# Patient Record
Sex: FEMALE | Race: WHITE | NOT HISPANIC OR LATINO | Employment: FULL TIME | URBAN - NONMETROPOLITAN AREA
[De-identification: names, ages, dates, MRNs, and addresses within clinical notes are randomized per-mention and may not be internally consistent; named-entity substitution may affect disease eponyms.]

---

## 2023-05-12 ENCOUNTER — OFFICE VISIT (OUTPATIENT)
Dept: INTERNAL MEDICINE | Facility: CLINIC | Age: 48
End: 2023-05-12
Payer: COMMERCIAL

## 2023-05-12 VITALS
WEIGHT: 209.5 LBS | DIASTOLIC BLOOD PRESSURE: 78 MMHG | OXYGEN SATURATION: 97 % | BODY MASS INDEX: 33.67 KG/M2 | HEART RATE: 83 BPM | TEMPERATURE: 97.2 F | SYSTOLIC BLOOD PRESSURE: 148 MMHG | HEIGHT: 66 IN

## 2023-05-12 DIAGNOSIS — Z23 NEED FOR COVID-19 VACCINE: ICD-10-CM

## 2023-05-12 DIAGNOSIS — M25.619 LIMITED RANGE OF MOTION (ROM) OF SHOULDER: ICD-10-CM

## 2023-05-12 DIAGNOSIS — M25.511 CHRONIC RIGHT SHOULDER PAIN: Primary | ICD-10-CM

## 2023-05-12 DIAGNOSIS — R03.0 ELEVATED BLOOD PRESSURE READING WITHOUT DIAGNOSIS OF HYPERTENSION: ICD-10-CM

## 2023-05-12 DIAGNOSIS — G89.29 CHRONIC RIGHT SHOULDER PAIN: Primary | ICD-10-CM

## 2023-05-12 DIAGNOSIS — Z72.0 TOBACCO ABUSE: ICD-10-CM

## 2023-05-12 DIAGNOSIS — M75.41 IMPINGEMENT SYNDROME OF RIGHT SHOULDER: ICD-10-CM

## 2023-05-12 NOTE — PROGRESS NOTES
Date: 2023    Name: Dhaval Aguilera  : 1975    Chief Complaint:   Chief Complaint   Patient presents with   • Establish Care     Patient here to establish care, increasing R shoulder pain   • Shoulder Pain     Increasing R shoulder pain, limited ROM       HPI:  Dhaval Aguilera is a 48 y.o. female presents to establish care.    Request records from prior PCP, GYN in [place].  Dionicio Escobar; [facility]. Delvin Javier; [facility]. [facility] ([facility])  Pap smear; 2020; hx hysterectomy, left ovaries  [facility]; [facility]  Due shingles; will get at pharmacy   Thinks she had Tdap <10 years ago  G 4 P 3 Sp Ab 1  No prescriptions, vitamins   Wears dentures  No exercise or diet, planning on starting Golo diet and implement exercise soon  Smoker; 1 PPD x 40 years; 40 pack years. Unsure if any LDCT screening. Tried wellbutrin, chantix, has patches at home. Not ready to quit at this time but would like to in the future. She has quit before.     Chronic pain right shoulder. Onset 3.5 years ago. Completed exercises with bands through "[facility] [facility] from 2022-2023 >6 weeks without relief. Takes naproxen prn for pain. Limited ROM. Right pinky finger paresthesia. Pain localized top of shoulder, refers right side of neck. Full ROM of neck and no pain. No prior imaging.     BP elevated today, no history of this. She checks at home with wrist cuff and says it is typically <120/80s.    History:      Past Surgical History:   Procedure Laterality Date   • SUBTOTAL HYSTERECTOMY  20   • TUBAL ABDOMINAL LIGATION  97       Family History   Problem Relation Age of Onset   • Alcohol abuse Mother    • Asthma Mother    • Liver disease Mother    • Alcohol abuse Maternal Grandfather    • COPD Maternal Grandmother    • Alcohol abuse Paternal Grandfather    • Stroke Paternal Grandfather    • Alcohol abuse Brother    • Alcohol abuse Maternal  "Aunt    • Drug abuse Son    • Drug abuse Son        Social History     Socioeconomic History   • Marital status:    Tobacco Use   • Smoking status: Every Day     Packs/day: 1.00     Years: 40.00     Pack years: 40.00     Types: Cigarettes     Start date: 4/14/1983   Substance and Sexual Activity   • Alcohol use: Not Currently   • Drug use: Never   • Sexual activity: Not Currently     Partners: Male     Birth control/protection: Hysterectomy       No Known Allergies      Current Outpatient Medications:   •  NAPROXEN PO, Take  by mouth., Disp: , Rfl:         VS:  Vitals:    05/12/23 1651 05/12/23 1733   BP: 140/76 148/78   BP Location: Right arm Left arm   Patient Position: Sitting Sitting   Cuff Size: Adult Adult   Pulse: 83    Temp: 97.2 °F (36.2 °C)    TempSrc: Temporal    SpO2: 97%    Weight: 95 kg (209 lb 8 oz)    Height: 168.3 cm (66.25\")      Body mass index is 33.56 kg/m².    PE:  Physical Exam  Vitals and nursing note reviewed.   Constitutional:       General: She is not in acute distress.     Appearance: Normal appearance. She is obese. She is not diaphoretic.   HENT:      Head: Normocephalic and atraumatic.      Right Ear: External ear normal.      Left Ear: External ear normal.      Nose: Nose normal.   Eyes:      General: No scleral icterus.     Extraocular Movements: Extraocular movements intact.   Neck:      Trachea: Trachea and phonation normal.   Cardiovascular:      Rate and Rhythm: Normal rate and regular rhythm.   Pulmonary:      Effort: Pulmonary effort is normal.      Breath sounds: Normal breath sounds.   Abdominal:      Tenderness: There is no abdominal tenderness.   Musculoskeletal:      Right shoulder: Tenderness present. No swelling, deformity or crepitus. Decreased range of motion. Decreased strength.      Cervical back: Normal range of motion.      Comments: + empty can test   Skin:     General: Skin is warm and dry.      Coloration: Skin is not jaundiced or pale.   Neurological:    "   Mental Status: She is alert and oriented to person, place, and time.      Gait: Gait normal.   Psychiatric:         Mood and Affect: Mood normal.         Behavior: Behavior normal.       Assessment/Plan:         Diagnoses and all orders for this visit:    1. Chronic right shoulder pain (Primary)  -     Ambulatory Referral to Orthopedic Surgery  -     MRI Shoulder Right Without Contrast; Future    2. Limited range of motion (ROM) of shoulder  -     Ambulatory Referral to Orthopedic Surgery  -     MRI Shoulder Right Without Contrast; Future    3. Impingement syndrome of right shoulder  -     Ambulatory Referral to Orthopedic Surgery  -     MRI Shoulder Right Without Contrast; Future    4. Elevated blood pressure reading without diagnosis of hypertension    5. Tobacco abuse    6. Need for COVID-19 vaccine  -     COVID-19 (Pfizer) Bivalent 12+yrs      Continue exercises, stretches, NSAIDs prn. Patient has failed PT >6 weeks. MRI ordered. Referral to orthopedics.     Blood pressure is elevated today.  Recommend DASH diet, less than 1500 mg of sodium daily, 150 minutes of exercise per week, and home blood pressure monitoring.  Keep a log of your blood pressures and bring with you into the next appointment along with your home blood pressure machine.  When you check your blood pressure, make sure you have been sitting for 15 to 20 minutes, feet flat on the floor, and arm at the level of your heart.  Normal BP is <120/80.  If blood pressure is over 180/110, be seen urgently.     Updated COVID-19 booster.     Dhaval Aguilera  reports that she has been smoking cigarettes. She started smoking about 40 years ago. She has a 40.00 pack-year smoking history. She does not have any smokeless tobacco history on file. I have educated her on the risk of diseases from using tobacco products.   I advised her to quit and she is Not Willing to Quit Tobacco Products at this time. I spent 5 minutes counseling the patient.       Return in  about 8 weeks (around 7/7/2023) for Annual.      Lillie Mcdowell

## 2023-05-18 ENCOUNTER — APPOINTMENT (OUTPATIENT)
Dept: GENERAL RADIOLOGY | Facility: HOSPITAL | Age: 48
End: 2023-05-18
Payer: COMMERCIAL

## 2023-05-18 ENCOUNTER — HOSPITAL ENCOUNTER (EMERGENCY)
Facility: HOSPITAL | Age: 48
Discharge: HOME | End: 2023-05-18
Attending: EMERGENCY MEDICINE
Payer: COMMERCIAL

## 2023-05-18 VITALS
SYSTOLIC BLOOD PRESSURE: 147 MMHG | DIASTOLIC BLOOD PRESSURE: 87 MMHG | HEART RATE: 87 BPM | BODY MASS INDEX: 33.59 KG/M2 | OXYGEN SATURATION: 96 % | TEMPERATURE: 98.7 F | RESPIRATION RATE: 17 BRPM | HEIGHT: 66 IN | WEIGHT: 209 LBS

## 2023-05-18 DIAGNOSIS — M79.672 LEFT FOOT PAIN: Primary | ICD-10-CM

## 2023-05-18 PROCEDURE — 73630 X-RAY EXAM OF FOOT: CPT

## 2023-05-18 PROCEDURE — 99284 EMERGENCY DEPT VISIT MOD MDM: CPT

## 2023-05-18 RX ORDER — IBUPROFEN 800 MG/1
800 TABLET ORAL ONCE
Status: COMPLETED | OUTPATIENT
Start: 2023-05-18 | End: 2023-05-18

## 2023-05-18 RX ADMIN — IBUPROFEN 800 MG: 800 TABLET ORAL at 18:54

## 2023-05-18 NOTE — ED PROVIDER NOTES
Subjective  History of Present Illness:    This is a 48-year-old female not on any anticoagulation who presents emergency room today for left foot pain.  Patient states that yesterday when lifting her foot she accidentally grazed the side of the couch with her left lateral foot and had minor pain afterwards.  Went to sleep, woke up with a soreness on the outside of her foot, tells me that she works at [employer] and had to stand on her feet all day causing worsening of the pain.  Pain is worse with weightbearing and ambulation and movement.  No bruising or swelling.  No falls or other trauma to the foot.  No ankle pain no other physical complaints.      Nurses Notes reviewed and agree, including vitals, allergies, social history and prior medical history.     REVIEW OF SYSTEMS: All systems reviewed and not pertinent unless noted.  Review of Systems   Musculoskeletal:        Left foot pain   All other systems reviewed and are negative.      History reviewed. No pertinent past medical history.    Allergies:    Patient has no known allergies.      Past Surgical History:   Procedure Laterality Date   • SUBTOTAL HYSTERECTOMY  12/09/20   • TUBAL ABDOMINAL LIGATION  01/18/97         Social History     Socioeconomic History   • Marital status:    Tobacco Use   • Smoking status: Every Day     Packs/day: 1.00     Years: 40.00     Pack years: 40.00     Types: Cigarettes     Start date: 4/14/1983   • Smokeless tobacco: Never   Vaping Use   • Vaping Use: Never used   Substance and Sexual Activity   • Alcohol use: Not Currently   • Drug use: Never   • Sexual activity: Not Currently     Partners: Male     Birth control/protection: Hysterectomy         Family History   Problem Relation Age of Onset   • Alcohol abuse Mother    • Asthma Mother    • Liver disease Mother    • Alcohol abuse Maternal Grandfather    • COPD Maternal Grandmother    • Alcohol abuse Paternal Grandfather    • Stroke Paternal Grandfather    • Alcohol abuse  "Brother    • Alcohol abuse Maternal Aunt    • Drug abuse Son    • Drug abuse Son        Objective  Physical Exam:  /91 (BP Location: Left arm, Patient Position: Sitting)   Pulse 88   Temp 98.7 °F (37.1 °C) (Oral)   Resp 18   Ht 167.6 cm (66\")   Wt 94.8 kg (209 lb)   SpO2 97%   BMI 33.73 kg/m²      Physical Exam  Vitals and nursing note reviewed.   Constitutional:       General: She is not in acute distress.     Appearance: Normal appearance. She is normal weight. She is not ill-appearing, toxic-appearing or diaphoretic.   HENT:      Nose: Nose normal.      Mouth/Throat:      Pharynx: Oropharynx is clear.   Eyes:      Extraocular Movements: Extraocular movements intact.   Cardiovascular:      Pulses: Normal pulses.      Comments: Appears well perfused  Pulmonary:      Effort: Pulmonary effort is normal.   Abdominal:      General: Abdomen is flat.   Musculoskeletal:         General: Tenderness present. No swelling, deformity or signs of injury. Normal range of motion.      Cervical back: Normal range of motion.      Comments: Tenderness palpated to the left lateral aspect of the foot.  Full range of motion of all digits, 2+ pedal and posterior tibial pulses bilaterally.  No overlying ecchymosis or swelling.  No obvious signs of trauma.  No ankle tenderness.  No proximal fibular tenderness.  Neurovascularly intact.   Skin:     General: Skin is warm and dry.      Capillary Refill: Capillary refill takes less than 2 seconds.   Neurological:      General: No focal deficit present.      Mental Status: She is alert and oriented to person, place, and time.   Psychiatric:         Mood and Affect: Mood normal.         Behavior: Behavior normal.         Thought Content: Thought content normal.         Judgment: Judgment normal.             Procedures    ED Course:         Lab Results (last 24 hours)     ** No results found for the last 24 hours. **           No radiology results from the last 24 hrs "       ELVIN    Initial impression of presenting illness: 48-year-old female presents emergency room today for evaluation of left foot pain after accidentally grazing the side of a couch yesterday and standing on it all day today.  Pain worse with movement and weightbearing    DDX: includes but is not limited to: Osseous abnormality occluding fracture dislocation, contusion, musculoskeletal strain or sprain, other    Patient arrives hemodynamically stable afebrile nontachycardic nonhypoxic and nontoxic-appearing with vitals interpreted by myself.     Pertinent features from physical exam: Tenderness palpated to the left lateral aspect of the foot.  Full range of motion of all digits, 2+ pedal and posterior tibial pulses bilaterally.  No overlying ecchymosis or swelling.  No obvious signs of trauma.  No overlying erythema.  No ankle tenderness.  No proximal fibular tenderness.  Neurovascularly intact..    Initial diagnostic plan: Plain film of the left foot    Results from initial plan were reviewed and interpreted by me revealing no acute fracture or dislocation of the left foot as interpreted by me.     Diagnostic information from other sources: Chart review    Interventions / Re-evaluation: Ibuprofen and ice.  Placed in a boot.  Stable for discharge home.    Results/clinical rationale were discussed with patient at bedside    Consultations/Discussion of results with other physicians: Attending physician prior to discharge.  Imaging reviewed with attending physican prior to discharge.    Disposition plan: Discharge home.  Orthopedic and primary care follow-up.  Return precautions given.  Supportive care discussed.  Agreeable to plan at bedside.  -----    Final diagnoses:   Left foot pain        Arash Martinez PA-C  05/18/23 1910

## 2023-05-18 NOTE — DISCHARGE INSTRUCTIONS
Return to the ER for any worsening symptoms.  Recommend close follow-up with your primary care physician, you can additionally call to schedule an appoint with orthopedics.  Wear the boot for comfort and support as needed.  Additionally recommend ice several times daily to the affected area.

## 2023-05-18 NOTE — Clinical Note
[facility] EMERGENCY DEPARTMENT  [address] [address]  Phone: [phone]    Dhaval Aguilera was seen and treated in our emergency department on 5/18/2023.  She may return to work on 05/22/2023.         Thank you for choosing [facility].    Arash Martinez PA-C

## 2023-06-06 ENCOUNTER — TELEMEDICINE (OUTPATIENT)
Dept: FAMILY MEDICINE CLINIC | Facility: TELEHEALTH | Age: 48
End: 2023-06-06
Payer: COMMERCIAL

## 2023-06-06 VITALS — TEMPERATURE: 100.2 F

## 2023-06-06 DIAGNOSIS — J06.9 ACUTE URI: Primary | ICD-10-CM

## 2023-06-06 PROCEDURE — 87635 SARS-COV-2 COVID-19 AMP PRB: CPT | Performed by: NURSE PRACTITIONER

## 2023-06-06 RX ORDER — BROMPHENIRAMINE MALEATE, PSEUDOEPHEDRINE HYDROCHLORIDE, AND DEXTROMETHORPHAN HYDROBROMIDE 2; 30; 10 MG/5ML; MG/5ML; MG/5ML
5 SYRUP ORAL 4 TIMES DAILY PRN
Qty: 118 ML | Refills: 0 | Status: SHIPPED | OUTPATIENT
Start: 2023-06-06 | End: 2023-06-16

## 2023-06-06 RX ORDER — ALBUTEROL SULFATE 90 UG/1
2 AEROSOL, METERED RESPIRATORY (INHALATION) EVERY 4 HOURS PRN
Qty: 18 G | Refills: 0 | Status: SHIPPED | OUTPATIENT
Start: 2023-06-06 | End: 2023-07-06

## 2023-06-06 NOTE — PATIENT INSTRUCTIONS
Order has been placed for SARS-CoV-2 (Coronavirus) test to be done at your nearest [facility]. You don't need to call the Urgent Care, just let them know when you arrive that you have been assessed by a Virtual Care Provider and that you have an order for testing. We will call with results when they are available. The results will be immediately released to your [employer] berna and you will receive a message from [employer] to view your result. Since we are mandated to call all results to the patients, you will receive a call from a blocked number. If you do not answer, you will be sent a message after the first attempted call to notify you that your test results are available. Continue to treat your symptoms just as you would for any viral illness. Take Tylenol for pain and/or fever,  Stay hydrated, rest and treat cough with over-the-counter cough syrup such as Robitussin. You will need to Self Quarantine until the following criteria has been met:   ?At least five days have passed since symptoms first appeared AND    ?At least one day (24 hours) has passed since resolution of fever without the use of fever-reducing medications AND     ?There is improvement in symptoms (eg, cough, shortness of breath)    After discontinuing home isolation, patients should continue to wear a well-fitting mask around others for 5 additional days. The total duration of isolation plus strict masking is 10 days. During this time, patients should avoid people who are immunocompromised or at high risk for severe disease. Additional information on restrictions (eg, travel) after the five-day isolation period can be found on the CDC website.  Upper Respiratory Infection, Adult  An upper respiratory infection (URI) is a common viral infection of the nose, throat, and upper air passages that lead to the lungs. The most common type of URI is the common cold. URIs usually get better on their own, without medical treatment.  What are the  causes?  A URI is caused by a virus. You may catch a virus by:  Breathing in droplets from an infected person's cough or sneeze.  Touching something that has been exposed to the virus (is contaminated) and then touching your mouth, nose, or eyes.  What increases the risk?  You are more likely to get a URI if:  You are very young or very old.  You have close contact with others, such as at work, school, or a health care facility.  You smoke.  You have long-term (chronic) heart or lung disease.  You have a weakened disease-fighting system (immune system).  You have nasal allergies or asthma.  You are experiencing a lot of stress.  You have poor nutrition.  What are the signs or symptoms?  A URI usually involves some of the following symptoms:  Runny or stuffy (congested) nose.  Cough.  Sneezing.  Sore throat.  Headache.  Fatigue.  Fever.  Loss of appetite.  Pain in your forehead, behind your eyes, and over your cheekbones (sinus pain).  Muscle aches.  Redness or irritation of the eyes.  Pressure in the ears or face.  How is this diagnosed?  This condition may be diagnosed based on your medical history and symptoms, and a physical exam. Your health care provider may use a swab to take a mucus sample from your nose (nasal swab). This sample can be tested to determine what virus is causing the illness.  How is this treated?  URIs usually get better on their own within 7-10 days. Medicines cannot cure URIs, but your health care provider may recommend certain medicines to help relieve symptoms, such as:  Over-the-counter cold medicines.  Cough suppressants. Coughing is a type of defense against infection that helps to clear the respiratory system, so take these medicines only as recommended by your health care provider.  Fever-reducing medicines.  Follow these instructions at home:  Activity  Rest as needed.  If you have a fever, stay home from work or school until your fever is gone or until your health care provider says  your URI cannot spread to other people (is no longer contagious). Your health care provider may have you wear a face mask to prevent your infection from spreading.  Relieving symptoms  Gargle with a mixture of salt and water 3-4 times a day or as needed. To make salt water, completely dissolve ½-1 tsp (3-6 g) of salt in 1 cup (237 mL) of warm water.  Use a cool-mist humidifier to add moisture to the air. This can help you breathe more easily.  Eating and drinking    Drink enough fluid to keep your urine pale yellow.  Eat soups and other clear broths.  General instructions    Take over-the-counter and prescription medicines only as told by your health care provider. These include cold medicines, fever reducers, and cough suppressants.  Do not use any products that contain nicotine or tobacco. These products include cigarettes, chewing tobacco, and vaping devices, such as e-cigarettes. If you need help quitting, ask your health care provider.  Stay away from secondhand smoke.  Stay up to date on all immunizations, including the yearly (annual) flu vaccine.  Keep all follow-up visits. This is important.  How to prevent the spread of infection to others  URIs can be contagious. To prevent the infection from spreading:  Wash your hands with soap and water for at least 20 seconds. If soap and water are not available, use hand .  Avoid touching your mouth, face, eyes, or nose.  Cough or sneeze into a tissue or your sleeve or elbow instead of into your hand or into the air.    Contact a health care provider if:  You are getting worse instead of better.  You have a fever or chills.  Your mucus is brown or red.  You have yellow or brown discharge coming from your nose.  You have pain in your face, especially when you bend forward.  You have swollen neck glands.  You have pain while swallowing.  You have white areas in the back of your throat.  Get help right away if:  You have shortness of breath that gets  worse.  You have severe or persistent:  Headache.  Ear pain.  Sinus pain.  Chest pain.  You have chronic lung disease along with any of the following:  Making high-pitched whistling sounds when you breathe, most often when you breathe out (wheezing).  Prolonged cough (more than 14 days).  Coughing up blood.  A change in your usual mucus.  You have a stiff neck.  You have changes in your:  Vision.  Hearing.  Thinking.  Mood.  These symptoms may be an emergency. Get help right away. Call 911.  Do not wait to see if the symptoms will go away.  Do not drive yourself to the hospital.  Summary  An upper respiratory infection (URI) is a common infection of the nose, throat, and upper air passages that lead to the lungs.  A URI is caused by a virus.  URIs usually get better on their own within 7-10 days.  Medicines cannot cure URIs, but your health care provider may recommend certain medicines to help relieve symptoms.  This information is not intended to replace advice given to you by your health care provider. Make sure you discuss any questions you have with your health care provider.  Document Revised: 07/20/2022 Document Reviewed: 07/20/2022  Elsevier Patient Education © 2022 [employer]

## 2023-06-06 NOTE — PROGRESS NOTES
"Chief Complaint   Patient presents with    Cough    Nasal Congestion    Headache       Video Visit Reason:   Free Text Description: If I should go and get tested for covid  Subjective   Dhaval Aguilera is a 48 y.o. female.     History of Present Illness  Sudden onset of sore throat, headache, nasal congestion, cough and shortness of breath today with temp of 100.2. She has some seasonal allergies in the fall/winter but she did take allergy medication without any relief today. She has discomfort in the chest that she describes as feeling \"like there is fluid in the lung\" and she points to central chest area. No wheezing.   Cough  This is a new problem. The current episode started today. The problem has been rapidly worsening. The problem occurs every few minutes. The cough is Non-productive. Associated symptoms include a fever, headaches, nasal congestion, postnasal drip, a sore throat and shortness of breath. Pertinent negatives include no chills or wheezing. Risk factors for lung disease include smoking/tobacco exposure. She has tried nothing for the symptoms. Her past medical history is significant for environmental allergies. There is no history of asthma.     The following portions of the patient's history were reviewed and updated as appropriate: allergies, current medications, past medical history, and problem list.      History reviewed. No pertinent past medical history.  Social History     Socioeconomic History    Marital status:    Tobacco Use    Smoking status: Every Day     Packs/day: 1.00     Years: 40.00     Pack years: 40.00     Types: Cigarettes     Start date: 4/14/1983    Smokeless tobacco: Never   Vaping Use    Vaping Use: Never used   Substance and Sexual Activity    Alcohol use: Not Currently    Drug use: Never    Sexual activity: Not Currently     Partners: Male     Birth control/protection: Hysterectomy     medication documentation: reviewed and updated with patient and   Current " Outpatient Medications:     NAPROXEN PO, Take  by mouth., Disp: , Rfl:     albuterol sulfate  (90 Base) MCG/ACT inhaler, Inhale 2 puffs Every 4 (Four) Hours As Needed for Wheezing or Shortness of Air for up to 30 days., Disp: 18 g, Rfl: 0    brompheniramine-pseudoephedrine-DM 30-2-10 MG/5ML syrup, Take 5 mL by mouth 4 (Four) Times a Day As Needed for Congestion or Cough for up to 10 days., Disp: 118 mL, Rfl: 0  Review of Systems   Constitutional:  Positive for activity change and fever. Negative for chills.   HENT:  Positive for congestion, postnasal drip and sore throat.    Respiratory:  Positive for cough, chest tightness and shortness of breath. Negative for wheezing.    Gastrointestinal:  Negative for nausea and vomiting.   Allergic/Immunologic: Positive for environmental allergies.   Neurological:  Positive for headaches. Negative for dizziness and light-headedness.     Objective   Temp 100.2 °F (37.9 °C)     Physical Exam  Constitutional:       General: She is not in acute distress.     Appearance: She is ill-appearing.   HENT:      Nose: Congestion present.      Mouth/Throat:      Pharynx: Oropharynx is clear.   Pulmonary:      Effort: Pulmonary effort is normal.      Comments: Frequent cough and hoarseness  Neurological:      Mental Status: She is alert.   Psychiatric:         Mood and Affect: Mood normal.         Speech: Speech normal.       Assessment & Plan   Diagnoses and all orders for this visit:    1. Acute URI (Primary)  -     COVID-19 PCR, [facility], NP SWAB IN [facility]/ORAL SWISH 24-30 HR TAT - Swab, Nasopharynx; Future  -     albuterol sulfate  (90 Base) MCG/ACT inhaler; Inhale 2 puffs Every 4 (Four) Hours As Needed for Wheezing or Shortness of Air for up to 30 days.  Dispense: 18 g; Refill: 0  -     brompheniramine-pseudoephedrine-DM 30-2-10 MG/5ML syrup; Take 5 mL by mouth 4 (Four) Times a Day As Needed for Congestion or Cough for up to 10 days.  Dispense: 118  mL; Refill: 0                    Follow Up:  If your symptoms are not resolving by the completion of your treatment or are worsening, see your primary care provider for follow up. If you don't have a primary care provider, you may go to any Urgent Care for re-evaluation. If you develop any life threatening symptoms, go to the nearest Emergency Department immediately or call EMS.               The use of  Video Visit was utilized during this visit, using both [employer] and [employer]. The use of a video visit has been reviewed with the patient and verbal informed consent has been obtained. No technical difficulties occurred during the visit.    is located at [address] [place] [address] 8[facility] [address]  Provider is located at [place], [place]

## 2023-06-13 ENCOUNTER — HOSPITAL ENCOUNTER (OUTPATIENT)
Dept: MRI IMAGING | Facility: HOSPITAL | Age: 48
Discharge: HOME | End: 2023-06-13
Admitting: NURSE PRACTITIONER
Payer: COMMERCIAL

## 2023-06-13 DIAGNOSIS — G89.29 CHRONIC RIGHT SHOULDER PAIN: ICD-10-CM

## 2023-06-13 DIAGNOSIS — M25.511 CHRONIC RIGHT SHOULDER PAIN: ICD-10-CM

## 2023-06-13 DIAGNOSIS — M75.41 IMPINGEMENT SYNDROME OF RIGHT SHOULDER: ICD-10-CM

## 2023-06-13 DIAGNOSIS — M25.619 LIMITED RANGE OF MOTION (ROM) OF SHOULDER: ICD-10-CM

## 2023-06-13 PROCEDURE — 73221 MRI JOINT UPR EXTREM W/O DYE: CPT

## 2023-06-15 DIAGNOSIS — M25.511 ARTHRALGIA OF RIGHT SHOULDER REGION: Primary | ICD-10-CM

## 2023-06-16 ENCOUNTER — OFFICE VISIT (OUTPATIENT)
Dept: ORTHOPEDIC SURGERY | Facility: CLINIC | Age: 48
End: 2023-06-16
Payer: COMMERCIAL

## 2023-06-16 VITALS
WEIGHT: 208 LBS | SYSTOLIC BLOOD PRESSURE: 133 MMHG | HEART RATE: 76 BPM | DIASTOLIC BLOOD PRESSURE: 83 MMHG | HEIGHT: 66 IN | BODY MASS INDEX: 33.43 KG/M2

## 2023-06-16 DIAGNOSIS — M75.101 NONTRAUMATIC TEAR OF RIGHT SUPRASPINATUS TENDON: Primary | ICD-10-CM

## 2023-06-16 DIAGNOSIS — M75.111 NONTRAUMATIC INCOMPLETE TEAR OF RIGHT ROTATOR CUFF: ICD-10-CM

## 2023-06-16 DIAGNOSIS — M54.12 CERVICAL RADICULOPATHY: ICD-10-CM

## 2023-06-16 RX ORDER — METHYLPREDNISOLONE ACETATE 40 MG/ML
40 INJECTION, SUSPENSION INTRA-ARTICULAR; INTRALESIONAL; INTRAMUSCULAR; SOFT TISSUE
Status: COMPLETED | OUTPATIENT
Start: 2023-06-16 | End: 2023-06-16

## 2023-06-16 RX ORDER — CYCLOBENZAPRINE HCL 10 MG
10 TABLET ORAL NIGHTLY PRN
Qty: 30 TABLET | Refills: 0 | Status: SHIPPED | OUTPATIENT
Start: 2023-06-16

## 2023-06-16 RX ORDER — LIDOCAINE HYDROCHLORIDE 20 MG/ML
2 INJECTION, SOLUTION INFILTRATION; PERINEURAL
Status: COMPLETED | OUTPATIENT
Start: 2023-06-16 | End: 2023-06-16

## 2023-06-16 RX ADMIN — METHYLPREDNISOLONE ACETATE 40 MG: 40 INJECTION, SUSPENSION INTRA-ARTICULAR; INTRALESIONAL; INTRAMUSCULAR; SOFT TISSUE at 09:10

## 2023-06-16 RX ADMIN — LIDOCAINE HYDROCHLORIDE 2 ML: 20 INJECTION, SOLUTION INFILTRATION; PERINEURAL at 09:10

## 2023-06-16 NOTE — PROGRESS NOTES
MRI of shoulder shows partial thickness articular surface tear of the supraspinatus tendon, no evidence of full-thickness rotator cuff tear, mild bone marrow edema in the greater tuberosity  I forwarded results to your orthopedic provider and see that you have an appointment with him tomorrow. Can review in further detail then.

## 2023-06-16 NOTE — PROGRESS NOTES
Office Note     Name: Dhaval Aguilera    : 1975     MRN: [id number]     Chief Complaint  Pain of the Right Shoulder (States she has been having pain for about three years, it has been getting worse. No known injury. States she does have numbness in her arm and fingers.)    Subjective     History of Present Illness:  Dhaval Aguilera is a 48 y.o. female presenting today for evaluation of right shoulder pain.  She has recent MRI showing a tear of the supraspinatus tendon nontraumatic.  Patient also complains of numbness and tingling going down the posterior arm into her elbow and little and ring fingers.  She states this numbness and tingling has been long lasting and she knows that she has cervical radiculopathy.     Review of Systems   Constitutional:  Negative for fever.   HENT:  Negative for dental problem and voice change.    Eyes:  Negative for visual disturbance.   Respiratory:  Negative for shortness of breath.    Cardiovascular:  Negative for chest pain.   Gastrointestinal:  Negative for abdominal pain.   Genitourinary:  Negative for dysuria.   Musculoskeletal:  Positive for arthralgias (right shoulder). Negative for gait problem and joint swelling.   Skin:  Negative for rash.   Neurological:  Negative for speech difficulty and numbness.   Hematological:  Does not bruise/bleed easily.   Psychiatric/Behavioral:  Negative for confusion.       History reviewed. No pertinent past medical history.     Past Surgical History:   Procedure Laterality Date    SUBTOTAL HYSTERECTOMY  20    TUBAL ABDOMINAL LIGATION  97       Family History   Problem Relation Age of Onset    Alcohol abuse Mother     Asthma Mother     Liver disease Mother     Alcohol abuse Maternal Grandfather     COPD Maternal Grandmother     Alcohol abuse Paternal Grandfather     Stroke Paternal Grandfather     Alcohol abuse Brother     Alcohol abuse Maternal Aunt     Drug abuse Son     Drug abuse Son        Social History  "    Socioeconomic History    Marital status:    Tobacco Use    Smoking status: Every Day     Packs/day: 1.00     Years: 40.00     Pack years: 40.00     Types: Cigarettes     Start date: 4/14/1983    Smokeless tobacco: Never   Vaping Use    Vaping Use: Never used   Substance and Sexual Activity    Alcohol use: Not Currently    Drug use: Never    Sexual activity: Not Currently     Partners: Male     Birth control/protection: Hysterectomy         Current Outpatient Medications:     albuterol sulfate  (90 Base) MCG/ACT inhaler, Inhale 2 puffs Every 4 (Four) Hours As Needed for Wheezing or Shortness of Air for up to 30 days., Disp: 18 g, Rfl: 0    cyclobenzaprine (FLEXERIL) 10 MG tablet, Take 1 tablet by mouth At Night As Needed for Muscle Spasms., Disp: 30 tablet, Rfl: 0    NAPROXEN PO, Take  by mouth., Disp: , Rfl:   No current facility-administered medications for this visit.    No Known Allergies        Objective   /83   Pulse 76   Ht 167.6 cm (65.98\")   Wt 94.3 kg (208 lb)   BMI 33.59 kg/m²    BMI is >= 30 and <35. (Class 1 Obesity). The following options were offered after discussion;: weight loss educational material (shared in after visit summary)       Physical Exam  Right Shoulder Exam     Tenderness   The patient is experiencing tenderness in the acromion.    Range of Motion   Active abduction:  abnormal   Passive abduction:  normal   Extension:  normal   External rotation:  abnormal   Forward flexion:  abnormal   Internal rotation 0 degrees:  abnormal     Muscle Strength   Abduction: 4/5   Internal rotation: 4/5   External rotation: 4/5   Supraspinatus: 4/5   Subscapularis: 5/5   Biceps: 5/5     Tests   Bautista test: positive  Impingement: positive  Drop arm: negative    Other   Erythema: absent  Sensation: decreased  Pulse: present    Comments:  Decree sensation over the posterior humeral and shoulder area.             Independent Review of Radiographic Studies:    No new imaging done " "today.  Reviewed relevant prior imaging with patient again today.    PROCEDURE: MRI SHOULDER RIGHT WO CONTRAST-     HISTORY: chronic shoulder pain >3.5 years, limited ROM, impingement  sydrome, suspect rotator cuff pathology, positive empty can test, >6  weeks of home therapy/exercises through program \"[facility]\" physical  therapy without relief; M25.511-Pain in right shoulder; G89.29-Other  chronic pain; M25.619-Stiffness of unspecified shoulder, not elsewhere  classified; M75.41-Impingement syndrome of right shoulder        FINDINGS: Multiplanar MR imaging of the right shoulder was performed  without contrast. There is a focal partial thickness articular surface  tear at the anterior footprint of the supraspinatus tendon involving  less than 50% of the thickness of the tendon. No full-thickness rotator  cuff tear is identified. There is mild AC joint arthrosis. A small  amount of fluid is seen in the subacromial/subdeltoid bursa. The glenoid  labrum is intact. The long head of the biceps tendon is intact. No  fractures identified. There is mild bone marrow edema at the anterior  aspect of the greater tuberosity. The musculature is intact. No  significant  glenohumeral joint effusion is seen. No soft tissue mass or  cyst is identified.         IMPRESSION:  Focal partial thickness articular surface tear at the  anterior footprint of the supraspinatus tendon without evidence of  full-thickness rotator cuff tear.     Mild bone marrow edema in the greater tuberosity.    Large Joint Arthrocentesis: R subacromial bursa  Date/Time: 6/16/2023 9:10 AM  Consent given by: patient  Site marked: site marked  Timeout: Immediately prior to procedure a time out was called to verify the correct patient, procedure, equipment, support staff and site/side marked as required   Supporting Documentation  Indications: pain   Procedure Details  Location: shoulder - R subacromial bursa  Preparation: Patient was prepped and draped in the " usual sterile fashion  Needle size: 22 G  Approach: posterior  Medications administered: 40 mg methylPREDNISolone acetate 40 MG/ML; 2 mL lidocaine 2%  Patient tolerance: patient tolerated the procedure well with no immediate complications      Assessment and Plan   Diagnoses and all orders for this visit:    1. Nontraumatic tear of right supraspinatus tendon (Primary)  -     Large Joint Arthrocentesis: R subacromial bursa  -     Ambulatory Referral to Physical Therapy Evaluate and treat, Ortho  -     cyclobenzaprine (FLEXERIL) 10 MG tablet; Take 1 tablet by mouth At Night As Needed for Muscle Spasms.  Dispense: 30 tablet; Refill: 0  -     lidocaine (XYLOCAINE) 2% injection 2 mL  -     methylPREDNISolone acetate (DEPO-medrol) injection 40 mg    2. Nontraumatic incomplete tear of right rotator cuff    3. Cervical radiculopathy       Regular exercise as tolerated  Orthopedic activities reviewed and patient expressed appreciation  Discussion of orthopedic goals  Risk, benefits, and merits of treatment alternatives reviewed with the patient and questions answered  Physical therapy referral given  Avoid offending activity  Ice, heat, and/or modalities as beneficial    Recommendations/Plan:  Exercise, medications, injections, other patient advice, and return appointment as noted.  Referral: Physical and Occupational Therapy referral.  Patient is encouraged to call or return for any issues or concerns.    Patient was given a steroid injection into the right shoulder for symptomatic relief.  She was given a course of physical therapy for strengthening and range of motion improvement.  I advised regular use of the right shoulder however avoid repetitive overhead or push pull movements.  Patient is a current everyday smoker I counseled her on smoking cessation and strongly advised that she quit.  Recheck is scheduled for 5 weeks to evaluate progress.  I did offer the patient a referral to Dr. Elizabeth a spine subspecialist however  she denied this at this time.  Recheck scheduled for 5 weeks advised patient to call or return to office sooner if needed    Return in about 5 weeks (around 7/21/2023), or if symptoms worsen or fail to improve.  Patient agreeable to call or return sooner for any concerns.

## 2023-07-26 ENCOUNTER — TREATMENT (OUTPATIENT)
Dept: PHYSICAL THERAPY | Facility: CLINIC | Age: 48
End: 2023-07-26
Payer: COMMERCIAL

## 2023-07-26 DIAGNOSIS — G89.29 CHRONIC RIGHT SHOULDER PAIN: Primary | ICD-10-CM

## 2023-07-26 DIAGNOSIS — M25.511 CHRONIC RIGHT SHOULDER PAIN: Primary | ICD-10-CM

## 2023-07-26 NOTE — PROGRESS NOTES
Physical Therapy Daily Progress Note    [address] [facility], [address]  [place] [address]      Patient: Dhaval Aguilera   : 1975  Diagnosis/ICD-10 Code:  Chronic right shoulder pain [M25.511, G89.29]  Referring practitioner: Jimmy Azar PA-C  Date of Initial Visit: Type: THERAPY  Noted: 2023  Today's Date: 2023  Patient seen for 5 sessions           Patient reports: awakening with mild pain but not from pain. She reports pain reaching to the side (demonstrates ER at 0 deg).      Objective   See Exercise, Manual, and Modality Logs for complete treatment.       Assessment/Plan  Reducing weight with posterior shoulder strengthening improved patient tolerance. Transitioned ER strengthening to isometrics to build tolerance as she reports pain with light resistance during strength testing. Progressed biceps/SA strengthening with fatigue.            Timed:  Manual Therapy:    3     mins  49058;  Therapeutic Exercise:    14     mins  95701;     Neuromuscular Carlita:   13    mins  97563;    Therapeutic Activity:     10     mins  69340;     Gait Trainin     mins  29215;     Ultrasound:     0     mins  82523;    Electrical Stimulation:    0     mins  25766 ( );    Untimed:  Electrical Stimulation:    0     mins  60203 ( );  Mechanical Traction:    0     mins  66076;     Timed Treatment:   40   mins   Total Treatment:     40   mins    Andrew Hughes PT  Physical Therapist

## 2023-07-28 ENCOUNTER — TREATMENT (OUTPATIENT)
Dept: PHYSICAL THERAPY | Facility: CLINIC | Age: 48
End: 2023-07-28
Payer: COMMERCIAL

## 2023-07-28 DIAGNOSIS — G89.29 CHRONIC RIGHT SHOULDER PAIN: Primary | ICD-10-CM

## 2023-07-28 DIAGNOSIS — M25.511 CHRONIC RIGHT SHOULDER PAIN: Primary | ICD-10-CM

## 2023-07-28 NOTE — PROGRESS NOTES
Re-Assessment / Re-Certification      [address]  [place] [address]    Patient: Dhaval Aguilera   : 1975  Diagnosis/ICD-10 Code:  Chronic right shoulder pain [M25.511, G89.29]  Referring practitioner: Jimmy Azar PA-C  Date of Initial Visit: Type: THERAPY  Noted: 2023  Today's Date: 2023  Patient seen for 6 sessions      Subjective:     Subjective Questionnaire: QuickDASH: 20%  Clinical Progress: improved  Home Program Compliance: Yes  Treatment has included: therapeutic exercise, neuromuscular re-education, manual therapy, and therapeutic activity    Subjective Evaluation    History of Present Illness  Mechanism of injury: CLOF: awakens with R shoulder stiffness/pain but not because of pain, R shoulder pain after prolonged driving, brushing hair using R UE    Medical history: 40 pack/year tobacco use, partial hysterectomy , chronic R cervical radiculopathy since  (R 5th digit numbness)    Subjective comment: Patient reports her R shoulder and R LE are hurting today from prolonged driving the past 2 days. She has had no R hand/finger numbness over the past few weeks.  Patient Occupation: sales at [employer]  Current pain ratin  At best pain ratin  At worst pain ratin       Objective          Active Range of Motion   Left Shoulder   Flexion: 170 degrees   Extension: 70 degrees   Abduction: 175 degrees   External rotation BTH: T4   Internal rotation BTB: T5     Right Shoulder   Flexion: 170 degrees   Extension: 70 degrees   Abduction: 180 degrees   External rotation BTH: T5   Internal rotation BTB: T8     Passive Range of Motion     Right Shoulder   Flexion: WFL and with pain  Abduction: WFL  External rotation 90°: WFL  Internal rotation 90°: WFL    Strength/Myotome Testing     Left Shoulder     Planes of Motion   Flexion: 4+   Extension: 4+   Abduction: 5   External rotation at 0°: 4+   Internal rotation at 0°: 4+     Isolated Muscles   Serratus anterior:  4+     Right Shoulder     Planes of Motion   Flexion: 4+   Extension: 5   Abduction: 5   External rotation at 0°: 4 (mild pain)   Internal rotation at 0°: 4+     Isolated Muscles   Biceps: 5   Serratus anterior: 4+   Triceps: 4+     Tests     Right Shoulder   Positive Neer's.   Negative empty can, full can and Hawkin's.     Assessment & Plan     Assessment  Impairments: abnormal coordination, abnormal muscle firing, abnormal or restricted ROM, activity intolerance, impaired physical strength, lacks appropriate home exercise program and pain with function  Functional Limitations: carrying objects, lifting, sleeping, pulling, pushing, uncomfortable because of pain, moving in bed, reaching behind back, reaching overhead and unable to perform repetitive tasks  Assessment details: Patient presents with chronic, worsening R shoulder pain, weakness, and reduced ROM. MRI revealed partial supraspinatus tear. Patient demonstrates poor R scapulohumeral rhythm during abduction, as well as compensatory scapular elevation with many R shoulder motions, which are likely learned habits over many years. Impingement tests are positive and she demonstrates weakness with shoulder ER and flexion.     7/28- Patient reports 90% perceived improvement in R shoulder since starting PT. She reports significant improvement in BADLs and overall function. R shoulder AROM has normalized with minimal pain. Her remaining deficits are related to R shoulder weakness and poor endurance, which will be the focus of her remaining PT.    Barriers to therapy: chronic tobacco use, chronic R cervical radiculopathy  Prognosis: good    Goals  Plan Goals: Short Term Goals (4 wks)  1. Patient will improve Quick Dash score to < 35 %. Met   2. Pt will demonstrate symmetrical, pain-free shoulder PROM. In progress  3. Patient will be independent and compliant with initial home exercise program. Met     Long Term Goals (8 wks)  1. Patient to demonstrate normal and pain  free UE strength with MMTs. In progress  2. Patient will improve Quick Dash score to < 25 %. Met   3. Patient to awaken <2x/week due to R shoulder pain. Met   4. Pt will demonstrate symmetrical, pain-free shoulder AROM. In progress  5. Pt. will be independent and compliant with advanced home exercise program to facilitate self-management of symptoms. In progress      Plan  Therapy options: will be seen for skilled therapy services  Planned modality interventions: cryotherapy, dry needling, TENS and thermotherapy (hydrocollator packs)  Planned therapy interventions: manual therapy, neuromuscular re-education, soft tissue mobilization, spinal/joint mobilization, therapeutic activities, strengthening, joint mobilization, home exercise program, functional ROM exercises, abdominal trunk stabilization, ADL retraining, balance/weight-bearing training, motor coordination training, body mechanics training, stretching, IADL retraining, flexibility and postural training  Frequency: 2x week  Duration in weeks: 4  Treatment plan discussed with: patient  Plan details: 2x/week for 4 weeks    Progress toward previous goals: Partially Met        Timeframe: 1 month  Prognosis to achieve goals: good    PT Signature: Andrew Hughes, PT  PT License [id number]    Based upon review of the patient's progress and continued therapy plan, it is my medical opinion that Dhaval Aguilera should continue physical therapy treatment at [facility]  [address] [place] [id number] [phone].    Signature: __________________________________  Jimmy Azar PA-C    Timed:  Manual Therapy:    0     mins  81194;  Therapeutic Exercise:    15     mins  17323;     Neuromuscular Carlita:    11    mins  81269;    Therapeutic Activity:     16     mins  71541;     Gait Trainin     mins  44283;     Ultrasound:     0     mins  47068;    Electrical Stimulation:    0     mins  35973 (  );    Untimed:  Electrical Stimulation:    0     mins  61613 ( );  Mechanical Traction:    0     mins  73285;     Timed Treatment:   42   mins   Total Treatment:     42   mins

## 2023-07-31 ENCOUNTER — TREATMENT (OUTPATIENT)
Dept: PHYSICAL THERAPY | Facility: CLINIC | Age: 48
End: 2023-07-31
Payer: COMMERCIAL

## 2023-07-31 DIAGNOSIS — G89.29 CHRONIC RIGHT SHOULDER PAIN: Primary | ICD-10-CM

## 2023-07-31 DIAGNOSIS — M25.511 CHRONIC RIGHT SHOULDER PAIN: Primary | ICD-10-CM

## 2023-08-07 ENCOUNTER — TREATMENT (OUTPATIENT)
Dept: PHYSICAL THERAPY | Facility: CLINIC | Age: 48
End: 2023-08-07
Payer: COMMERCIAL

## 2023-08-07 DIAGNOSIS — G89.29 CHRONIC RIGHT SHOULDER PAIN: Primary | ICD-10-CM

## 2023-08-07 DIAGNOSIS — M25.511 CHRONIC RIGHT SHOULDER PAIN: Primary | ICD-10-CM

## 2023-08-07 NOTE — PROGRESS NOTES
Physical Therapy Daily Progress Note    [address] [facility], [address]  [place] [address]      Patient: Dhaval Aguilera   : 1975  Diagnosis/ICD-10 Code:  Chronic right shoulder pain [M25.511, G89.29]  Referring practitioner: Jimmy Azar PA-C  Date of Initial Visit: Type: THERAPY  Noted: 2023  Today's Date: 2023  Patient seen for 8 sessions           Patient reports: her R shoulder only hurts in the front how, especially when she is trying to lower an item down slowly to the side.      Objective   See Exercise, Manual, and Modality Logs for complete treatment.       Assessment/Plan  Advanced biceps eccentrics. Shoulder horizontal abduction stretch was DCed due to increased pain reported today and over the weekend. R UE fatigue generally reported after treatment. Relief of symptoms reported after shoulder IR stretch.            Timed:  Manual Therapy:    0     mins  81223;  Therapeutic Exercise:    22     mins  49360;     Neuromuscular Carlita:   15    mins  86351;    Therapeutic Activity:     8     mins  78137;     Gait Trainin     mins  64548;     Ultrasound:     0     mins  85747;    Electrical Stimulation:    0     mins  28295 ( );    Untimed:  Electrical Stimulation:    0     mins  75792 ( );  Mechanical Traction:    0     mins  23724;     Timed Treatment:   45   mins   Total Treatment:     45   mins    Andrew Hughes PT  Physical Therapist

## 2023-08-10 ENCOUNTER — TREATMENT (OUTPATIENT)
Dept: PHYSICAL THERAPY | Facility: CLINIC | Age: 48
End: 2023-08-10
Payer: COMMERCIAL

## 2023-08-10 DIAGNOSIS — M25.511 CHRONIC RIGHT SHOULDER PAIN: Primary | ICD-10-CM

## 2023-08-10 DIAGNOSIS — G89.29 CHRONIC RIGHT SHOULDER PAIN: Primary | ICD-10-CM

## 2023-08-10 NOTE — PROGRESS NOTES
Physical Therapy Daily Progress Note    [address]  [place] [address]      Patient: Dhaval Aguilera   : 1975  Diagnosis/ICD-10 Code:  Chronic right shoulder pain [M25.511, G89.29]  Referring practitioner: Jimmy Azar PA-C  Date of Initial Visit: Type: THERAPY  Noted: 2023  Today's Date: 8/10/2023  Patient seen for 9 sessions           Patient reports: her shoulder feels stiff but so does the rest of her body after cleaning her house yesterday.      Objective   See Exercise, Manual, and Modality Logs for complete treatment.       Assessment/Plan  Screen of R shoulder revealed good PROM with only slight pain at end range flexion. Added ER strengthening at 90 to improve dynamic cuff stability, but she reported pain when her elbow was unsupported. Changing position to supine to support her R UE allow this to become pain free.             Timed:  Manual Therapy:    0     mins  94754;  Therapeutic Exercise:    20     mins  58202;     Neuromuscular Carlita:   15    mins  20522;    Therapeutic Activity:     10     mins  98734;     Gait Trainin     mins  25031;     Ultrasound:     0     mins  86638;    Electrical Stimulation:    0     mins  10010 ( );    Untimed:  Electrical Stimulation:    0     mins  79922 ( );  Mechanical Traction:    0     mins  25182;     Timed Treatment:   45   mins   Total Treatment:     45   mins    Andrew Hughes PT  Physical Therapist

## 2023-08-14 ENCOUNTER — TREATMENT (OUTPATIENT)
Dept: PHYSICAL THERAPY | Facility: CLINIC | Age: 48
End: 2023-08-14
Payer: COMMERCIAL

## 2023-08-14 DIAGNOSIS — G89.29 CHRONIC RIGHT SHOULDER PAIN: Primary | ICD-10-CM

## 2023-08-14 DIAGNOSIS — M25.511 CHRONIC RIGHT SHOULDER PAIN: Primary | ICD-10-CM

## 2023-08-14 NOTE — PROGRESS NOTES
Physical Therapy Daily Progress Note    [address] [facility], [address]  [place] [address]      Patient: Dhaval Aguilera   : 1975  Diagnosis/ICD-10 Code:  Chronic right shoulder pain [M25.511, G89.29]  Referring practitioner: Jimmy Azar PA-C  Date of Initial Visit: Type: THERAPY  Noted: 2023  Today's Date: 2023  Patient seen for 10 sessions           Patient reports: her R shoulder felt good this weekend, but her R sciatica has been aggravated. She did not complete new exercise for ER at 90 for HEP.      Objective   See Exercise, Manual, and Modality Logs for complete treatment.       Assessment/Plan  Patient demonstrated improved R UE endurance without c/o pain.  Advanced biceps eccentrics without pain. She continues to demonstrate mild limitation with R shoulder flexion ROM.            Timed:  Manual Therapy:    0     mins  16730;  Therapeutic Exercise:    20     mins  07562;     Neuromuscular Carlita:   12    mins  47107;    Therapeutic Activity:     6     mins  07639;     Gait Trainin     mins  13566;     Ultrasound:     0     mins  00912;    Electrical Stimulation:    0     mins  56823 ( );    Untimed:  Electrical Stimulation:    0     mins  70913 ( );  Mechanical Traction:    0     mins  49727;     Timed Treatment:   38   mins   Total Treatment:     38   mins    Andrew Hughes PT  Physical Therapist

## 2023-08-17 ENCOUNTER — TREATMENT (OUTPATIENT)
Dept: PHYSICAL THERAPY | Facility: CLINIC | Age: 48
End: 2023-08-17
Payer: COMMERCIAL

## 2023-08-17 DIAGNOSIS — M25.511 CHRONIC RIGHT SHOULDER PAIN: Primary | ICD-10-CM

## 2023-08-17 DIAGNOSIS — G89.29 CHRONIC RIGHT SHOULDER PAIN: Primary | ICD-10-CM

## 2023-08-17 NOTE — PROGRESS NOTES
Physical Therapy Daily Progress Note    [address] [facility], [address]  [place] [address]      Patient: Dhaval Aguilera   : 1975  Diagnosis/ICD-10 Code:  Chronic right shoulder pain [M25.511, G89.29]  Referring practitioner: Jimmy Azar PA-C  Date of Initial Visit: Type: THERAPY  Noted: 2023  Today's Date: 2023  Patient seen for 11 sessions           Patient reports: her R shoulder has felt pretty good the past few days.      Objective   See Exercise, Manual, and Modality Logs for complete treatment.       Assessment/Plan  Progressed rotator cuff strengthening in unstable positioning, which patient completed with fatigue but no pain. This had been painful 2 weeks ago when previous attempted. Also progressed [facility] UE strengthening.    Patient reports no recent shoulder pain and overall 85% improvement. Frequency is being reduced to 1x/week.        Timed:  Manual Therapy:    0     mins  29721;  Therapeutic Exercise:    20     mins  32248;     Neuromuscular Carlita:   10    mins  39598;    Therapeutic Activity:     10     mins  97030;     Gait Trainin     mins  71215;     Ultrasound:     0     mins  72634;    Electrical Stimulation:    0     mins  73781 ( );    Untimed:  Electrical Stimulation:    0     mins  56873 ( );  Mechanical Traction:    0     mins  64533;     Timed Treatment:   40   mins   Total Treatment:     40   mins    Andrew Hughes PT  Physical Therapist

## 2023-08-24 ENCOUNTER — TREATMENT (OUTPATIENT)
Dept: PHYSICAL THERAPY | Facility: CLINIC | Age: 48
End: 2023-08-24
Payer: COMMERCIAL

## 2023-08-24 DIAGNOSIS — M25.511 CHRONIC RIGHT SHOULDER PAIN: Primary | ICD-10-CM

## 2023-08-24 DIAGNOSIS — G89.29 CHRONIC RIGHT SHOULDER PAIN: Primary | ICD-10-CM

## 2023-08-24 NOTE — PROGRESS NOTES
Physical Therapy Daily Progress Note    [address] [facility], [address]  [place] [address]      Patient: Dhaval Aguilera   : 1975  Diagnosis/ICD-10 Code:  Chronic right shoulder pain [M25.511, G89.29]  Referring practitioner: Jimmy Azar PA-C  Date of Initial Visit: Type: THERAPY  Noted: 2023  Today's Date: 2023  Patient seen for 12 sessions           Patient reports: her shoulder is 95% improved.       Objective   See Exercise, Manual, and Modality Logs for complete treatment.       Assessment/Plan  Previously painful motions of R shoulder IR in abducted position, in addition to flexion, were pain free without limitation today. She continues to demonstrate R shoulder ER weakness in multiple positions, but this was not painful today. Progressed strengthening as tolerated today, but she reported general fatigue from poor sleep last night.    Patient will be reassessed NV and will likely transition to independent HEP at that time.        Timed:  Manual Therapy:    0     mins  49054;  Therapeutic Exercise:    20     mins  44822;     Neuromuscular Carlita:   10    mins  29999;    Therapeutic Activity:     10     mins  71231;     Gait Trainin     mins  87284;     Ultrasound:     0     mins  40083;    Electrical Stimulation:    0     mins  03413 ( );    Untimed:  Electrical Stimulation:    0     mins  85258 ( );  Mechanical Traction:    0     mins  23809;     Timed Treatment:   40   mins   Total Treatment:     40   mins    Andrew Hughes PT  Physical Therapist

## 2023-08-28 ENCOUNTER — OFFICE VISIT (OUTPATIENT)
Dept: INTERNAL MEDICINE | Facility: CLINIC | Age: 48
End: 2023-08-28
Payer: COMMERCIAL

## 2023-08-28 VITALS
HEART RATE: 81 BPM | BODY MASS INDEX: 33.43 KG/M2 | TEMPERATURE: 97.5 F | OXYGEN SATURATION: 95 % | SYSTOLIC BLOOD PRESSURE: 132 MMHG | DIASTOLIC BLOOD PRESSURE: 80 MMHG | HEIGHT: 66 IN | WEIGHT: 208 LBS

## 2023-08-28 DIAGNOSIS — R03.0 ELEVATED BLOOD PRESSURE READING WITHOUT DIAGNOSIS OF HYPERTENSION: ICD-10-CM

## 2023-08-28 DIAGNOSIS — Z13.228 SCREENING FOR ENDOCRINE, METABOLIC AND IMMUNITY DISORDER: ICD-10-CM

## 2023-08-28 DIAGNOSIS — Z13.1 SCREENING FOR DIABETES MELLITUS (DM): ICD-10-CM

## 2023-08-28 DIAGNOSIS — Z13.0 SCREENING FOR ENDOCRINE, METABOLIC AND IMMUNITY DISORDER: ICD-10-CM

## 2023-08-28 DIAGNOSIS — F17.210 SMOKING GREATER THAN 20 PACK YEARS: ICD-10-CM

## 2023-08-28 DIAGNOSIS — Z12.31 SCREENING MAMMOGRAM FOR BREAST CANCER: ICD-10-CM

## 2023-08-28 DIAGNOSIS — Z13.29 SCREENING FOR THYROID DISORDER: ICD-10-CM

## 2023-08-28 DIAGNOSIS — Z23 NEED FOR TDAP VACCINATION: ICD-10-CM

## 2023-08-28 DIAGNOSIS — Z13.0 SCREENING FOR DEFICIENCY ANEMIA: ICD-10-CM

## 2023-08-28 DIAGNOSIS — Z13.29 SCREENING FOR ENDOCRINE, METABOLIC AND IMMUNITY DISORDER: ICD-10-CM

## 2023-08-28 DIAGNOSIS — Z12.11 SCREENING FOR COLON CANCER: ICD-10-CM

## 2023-08-28 DIAGNOSIS — Z00.00 ANNUAL PHYSICAL EXAM: Primary | ICD-10-CM

## 2023-08-28 DIAGNOSIS — Z11.59 ENCOUNTER FOR HEPATITIS C SCREENING TEST FOR LOW RISK PATIENT: ICD-10-CM

## 2023-08-28 DIAGNOSIS — Z23 NEED FOR PNEUMOCOCCAL VACCINE: ICD-10-CM

## 2023-08-28 DIAGNOSIS — Z13.220 SCREENING FOR CHOLESTEROL LEVEL: ICD-10-CM

## 2023-08-28 DIAGNOSIS — E66.9 OBESITY (BMI 30-39.9): ICD-10-CM

## 2023-08-28 NOTE — PROGRESS NOTES
Subjective   Dhaval Aguilera is a 48 y.o. female and is here for a comprehensive physical exam.       HPI:    Health Habits:  Eye exam within last 2 years? No, will schedule  Dental exam every 6 months? No, has top dentures, lost bottoms  Exercise: No  Diet: typical american diet   Caffeine: coffee, tea, ale8  Alcohol: none  Nicotine: yes,1 pack per day, 40 pack year history   Do you take any herbs or supplements that were not prescribed by a doctor? no     History:  LMP: No LMP recorded. Patient has had a hysterectomy.  Menopause: Yes  Abnormal pap? no  Family history of breast, cervical, colon cancer: no  Mammogram: 2022       reports that she is not currently sexually active and has had partner(s) who are male. She reports using the following method of birth control/protection: Hysterectomy.    History reviewed. No pertinent past medical history.    Past Surgical History:   Procedure Laterality Date    SUBTOTAL HYSTERECTOMY  20    TUBAL ABDOMINAL LIGATION  97       Family History   Problem Relation Age of Onset    Alcohol abuse Mother     Asthma Mother     Liver disease Mother     Alcohol abuse Maternal Grandfather     COPD Maternal Grandmother     Alcohol abuse Paternal Grandfather     Stroke Paternal Grandfather     Alcohol abuse Brother     Alcohol abuse Maternal Aunt     Drug abuse Son     Drug abuse Son        Social History     Socioeconomic History    Marital status:    Tobacco Use    Smoking status: Every Day     Packs/day: 1.00     Years: 40.00     Pack years: 40.00     Types: Cigarettes     Start date: 1983    Smokeless tobacco: Never   Vaping Use    Vaping Use: Never used   Substance and Sexual Activity    Alcohol use: Not Currently    Drug use: Never    Sexual activity: Not Currently     Partners: Male     Birth control/protection: Hysterectomy       No Known Allergies      Current Outpatient Medications:     cyclobenzaprine (FLEXERIL) 10 MG tablet, Take 1 tablet by  "mouth At Night As Needed for Muscle Spasms., Disp: 30 tablet, Rfl: 0    NAPROXEN PO, Take  by mouth., Disp: , Rfl:         Objective   /80   Pulse 81   Temp 97.5 øF (36.4 øC)   Ht 167.6 cm (65.98\")   Wt 94.3 kg (208 lb)   SpO2 95%   BMI 33.59 kg/mý     Physical Exam  Vitals and nursing note reviewed.   Constitutional:       General: She is not in acute distress.     Appearance: Normal appearance. She is obese.   HENT:      Head: Normocephalic and atraumatic.      Right Ear: Tympanic membrane, ear canal and external ear normal.      Left Ear: Tympanic membrane, ear canal and external ear normal.      Nose: Nose normal.      Mouth/Throat:      Mouth: Mucous membranes are moist.      Pharynx: Oropharynx is clear.   Eyes:      General: No scleral icterus.     Extraocular Movements: Extraocular movements intact.      Conjunctiva/sclera: Conjunctivae normal.      Pupils: Pupils are equal, round, and reactive to light.   Neck:      Thyroid: No thyromegaly.   Cardiovascular:      Rate and Rhythm: Normal rate and regular rhythm.      Pulses: Normal pulses.      Heart sounds: Normal heart sounds.   Pulmonary:      Effort: Pulmonary effort is normal.      Breath sounds: Normal breath sounds.   Abdominal:      General: Abdomen is flat. Bowel sounds are normal. There is no distension.      Palpations: Abdomen is soft.      Tenderness: There is no abdominal tenderness. There is no guarding.   Genitourinary:     Comments: defer  Musculoskeletal:         General: Normal range of motion.      Cervical back: Normal range of motion and neck supple.   Lymphadenopathy:      Cervical: No cervical adenopathy.   Skin:     General: Skin is warm and dry.      Coloration: Skin is not jaundiced or pale.      Findings: No rash.   Neurological:      Mental Status: She is alert and oriented to person, place, and time.      Cranial Nerves: No cranial nerve deficit.      Motor: No weakness.      Coordination: Coordination normal.      " Gait: Gait normal.   Psychiatric:         Mood and Affect: Mood normal.         Behavior: Behavior normal.         Thought Content: Thought content normal.         Judgment: Judgment normal.          Assessment & Plan      Diagnosis Plan   1. Annual physical exam  CBC (No Diff)    Comprehensive Metabolic Panel    Lipid Panel    TSH Rfx On Abnormal To Free T4    Hemoglobin A1c      2. Screening for thyroid disorder  TSH Rfx On Abnormal To Free T4      3. Screening for diabetes mellitus (DM)  Comprehensive Metabolic Panel    Hemoglobin A1c      4. Screening for deficiency anemia  CBC (No Diff)      5. Screening for cholesterol level  Lipid Panel      6. Screening for endocrine, metabolic and immunity disorder  Comprehensive Metabolic Panel      7. Screening for colon cancer  Cologuard - Stool, Per Rectum      8. Screening mammogram for breast cancer  Mammo screening digital tomosynthesis bilateral w CAD      9. Encounter for hepatitis C screening test for low risk patient  Hepatitis C antibody      10. Elevated blood pressure reading without diagnosis of hypertension  Blood pressure is elevated today.  Recommend DASH diet, less than 1500 mg of sodium daily, 150 minutes of exercise per week, and home blood pressure monitoring. Cessation of tobacco and caffeine. Keep a log of your blood pressures and bring with you into the next appointment along with your home blood pressure machine.  When you check your blood pressure, make sure you have been sitting for 15 to 20 minutes, feet flat on the floor, and arm at the level of your heart. Normal BP <120/80.       11. Smoking greater than 20 pack years  CT chest low dose wo  Dhaval Aguilera  reports that she has been smoking cigarettes. She started smoking about 40 years ago. She has a 40.00 pack-year smoking history. She has never used smokeless tobacco. I have educated her on the risk of diseases from using tobacco products such as cancer, COPD, and heart disease  I advised  her to quit and she is not willing to quit.  I spent 3  minutes counseling the patient.      12. Need for pneumococcal vaccine  Pneumococcal Conjugate Vaccine 20-Valent (PCV20)      13. Need for Tdap vaccination  Tdap Vaccine => 6yo IM (BOOSTRIX)      14. Obesity (BMI 30-39.9)              Counseling  Health Maintenance screenings and vaccinations updated, encouraged  Mammogram; ordered  Pap smear; not indicated hysterectomy   Colon cancer screening; cologuard ordered, declines colonoscopy   Encouraged 150 minutes of exercise total per week for heart health   Recommend balanced diet, portion control, limiting excess carbs and sweets, limit caffeine   Dental visits twice per year, yearly eye exams   Vitamin D 2000 IU, Calcium 1200 mg daily  BMI is above average; BMI management plan is completed       4. Return in about 1 year (around 8/28/2024) for Annual.        Lillie Mcdowell, APRN  08/28/2023  08:20 EDT

## 2023-08-29 ENCOUNTER — LAB (OUTPATIENT)
Dept: LAB | Facility: HOSPITAL | Age: 48
End: 2023-08-29
Payer: COMMERCIAL

## 2023-08-29 LAB
ALBUMIN SERPL-MCNC: 4.1 G/DL (ref 3.5–5.2)
ALBUMIN/GLOB SERPL: 1.8 G/DL
ALP SERPL-CCNC: 62 U/L (ref 39–117)
ALT SERPL W P-5'-P-CCNC: 17 U/L (ref 1–33)
ANION GAP SERPL CALCULATED.3IONS-SCNC: 9.2 MMOL/L (ref 5–15)
AST SERPL-CCNC: 18 U/L (ref 1–32)
BILIRUB SERPL-MCNC: 0.6 MG/DL (ref 0–1.2)
BUN SERPL-MCNC: 11 MG/DL (ref 6–20)
BUN/CREAT SERPL: 11.3 (ref 7–25)
CALCIUM SPEC-SCNC: 9 MG/DL (ref 8.6–10.5)
CHLORIDE SERPL-SCNC: 106 MMOL/L (ref 98–107)
CHOLEST SERPL-MCNC: 179 MG/DL (ref 0–200)
CO2 SERPL-SCNC: 24.8 MMOL/L (ref 22–29)
CREAT SERPL-MCNC: 0.97 MG/DL (ref 0.57–1)
DEPRECATED RDW RBC AUTO: 37.5 FL (ref 37–54)
EGFRCR SERPLBLD CKD-EPI 2021: 71.3 ML/MIN/1.73
ERYTHROCYTE [DISTWIDTH] IN BLOOD BY AUTOMATED COUNT: 11.7 % (ref 12.3–15.4)
GLOBULIN UR ELPH-MCNC: 2.3 GM/DL
GLUCOSE SERPL-MCNC: 93 MG/DL (ref 65–99)
HBA1C MFR BLD: 5.5 % (ref 4.8–5.6)
HCT VFR BLD AUTO: 44.6 % (ref 34–46.6)
HCV AB SER DONR QL: NORMAL
HDLC SERPL-MCNC: 54 MG/DL (ref 40–60)
HGB BLD-MCNC: 15.4 G/DL (ref 12–15.9)
LDLC SERPL CALC-MCNC: 106 MG/DL (ref 0–100)
LDLC/HDLC SERPL: 1.91 {RATIO}
MCH RBC QN AUTO: 30 PG (ref 26.6–33)
MCHC RBC AUTO-ENTMCNC: 34.5 G/DL (ref 31.5–35.7)
MCV RBC AUTO: 86.8 FL (ref 79–97)
PLATELET # BLD AUTO: 204 10*3/MM3 (ref 140–450)
PMV BLD AUTO: 10.7 FL (ref 6–12)
POTASSIUM SERPL-SCNC: 4.4 MMOL/L (ref 3.5–5.2)
PROT SERPL-MCNC: 6.4 G/DL (ref 6–8.5)
RBC # BLD AUTO: 5.14 10*6/MM3 (ref 3.77–5.28)
SODIUM SERPL-SCNC: 140 MMOL/L (ref 136–145)
TRIGL SERPL-MCNC: 108 MG/DL (ref 0–150)
TSH SERPL DL<=0.05 MIU/L-ACNC: 1.35 UIU/ML (ref 0.27–4.2)
VLDLC SERPL-MCNC: 19 MG/DL (ref 5–40)
WBC NRBC COR # BLD: 7.17 10*3/MM3 (ref 3.4–10.8)

## 2023-08-29 PROCEDURE — 83036 HEMOGLOBIN GLYCOSYLATED A1C: CPT | Performed by: NURSE PRACTITIONER

## 2023-08-29 PROCEDURE — 80050 GENERAL HEALTH PANEL: CPT | Performed by: NURSE PRACTITIONER

## 2023-08-29 PROCEDURE — 80061 LIPID PANEL: CPT | Performed by: NURSE PRACTITIONER

## 2023-08-29 PROCEDURE — 36415 COLL VENOUS BLD VENIPUNCTURE: CPT | Performed by: NURSE PRACTITIONER

## 2023-08-29 PROCEDURE — 86803 HEPATITIS C AB TEST: CPT | Performed by: NURSE PRACTITIONER

## 2023-08-30 NOTE — PROGRESS NOTES
Labs overall great. LDL or bad cholesterol is only mildly elevated. Follow healthy low cholesterol diet and exercise regimen.

## 2023-08-31 ENCOUNTER — TREATMENT (OUTPATIENT)
Dept: PHYSICAL THERAPY | Facility: CLINIC | Age: 48
End: 2023-08-31
Payer: COMMERCIAL

## 2023-08-31 DIAGNOSIS — M25.511 CHRONIC RIGHT SHOULDER PAIN: Primary | ICD-10-CM

## 2023-08-31 DIAGNOSIS — G89.29 CHRONIC RIGHT SHOULDER PAIN: Primary | ICD-10-CM

## 2023-08-31 NOTE — PROGRESS NOTES
Physical Therapy Daily Progress Note and Discharge Summary      [address] [facility], [address]  [place] [address]    Patient: Dhaval Aguilera   : 1975  Diagnosis/ICD-10 Code:  Chronic right shoulder pain [M25.511, G89.29]  Referring practitioner: Jimmy Azar PA-C  Date of Initial Visit: Type: THERAPY  Noted: 2023  Today's Date: 2023  Patient seen for 13 sessions      Subjective:     Subjective Questionnaire: QuickDASH: 10%    Subjective Evaluation    History of Present Illness  Mechanism of injury: CLOF: R shoulder no longer affecting sleep, no R shoulder pain after prolonged driving, brushing hair using R UE    Medical history: 40 pack/year tobacco use, partial hysterectomy , chronic R cervical radiculopathy since  (R 5th digit numbness)    Subjective comment: Patient reports she was doing well until she received a vaccine in her R UE 4 days ago, since which she has experienced decline in overall R shoulder symptoms.  Patient Occupation: sales at [employer]  Current pain ratin  At best pain ratin  At worst pain ratin       Objective          Active Range of Motion   Left Shoulder   Flexion: 170 degrees   Extension: 70 degrees   Abduction: 175 degrees   External rotation BTH: T4   Internal rotation BTB: T5     Right Shoulder   Flexion: 170 degrees   Extension: 70 degrees   Abduction: 180 degrees   External rotation BTH: T5   Internal rotation BTB: T8     Passive Range of Motion     Right Shoulder   Flexion: WFL and with pain  Abduction: WFL and with pain  External rotation 90ø: WFL  Internal rotation 90ø: WFL    Strength/Myotome Testing     Left Shoulder     Planes of Motion   Flexion: 4+   Extension: 4+   Abduction: 5   External rotation at 0ø: 4+   Internal rotation at 0ø: 4+     Right Shoulder     Planes of Motion   Flexion: 5   Extension: 5   Abduction: 5   External rotation at 0ø: 4 (mild pain)   Internal rotation at 0ø: 4+ (pain)       Goals  Short Term Goals (4  wks)  1. Patient will improve Quick Dash score to < 35 %. Met   2. Pt will demonstrate symmetrical, pain-free shoulder PROM. Mostly met  3. Patient will be independent and compliant with initial home exercise program. Met      Long Term Goals (8 wks)  1. Patient to demonstrate normal and pain free UE strength with MMTs. Partially met  2. Patient will improve Quick Dash score to < 25 %. Met   3. Patient to awaken <2x/week due to R shoulder pain. Met   4. Pt will demonstrate symmetrical, pain-free shoulder AROM. Met  5. Pt. will be independent and compliant with advanced home exercise program to facilitate self-management of symptoms. Met    Discharge Summary  Patient was reporting 95% perceived improvement in R shoulder prior to receiving vaccine in R shoulder 4 days ago. Since then, she reports increased soreness with certain R UE positions and with resisted motion, but not with most AROM or during BADLs. Patient still demonstrates very good objective improvements compared to 1 month ago. It is expected her R shoulder will return to PLOF from vaccine in the coming weeks. If it fails to improve, she will return to PT for reassess. Otherwise, she should maintain CLOF and experience symptom improvements with compliance with HEP.    Timed:  Manual Therapy:    0     mins  01470;  Therapeutic Exercise:    10     mins  62235;     Neuromuscular Carlita:    0    mins  14493;    Therapeutic Activity:     15     mins  99975;     Gait Trainin     mins  71636;     Ultrasound:     0     mins  59857;    Electrical Stimulation:    0     mins  43755 ( );    Untimed:  Electrical Stimulation:    0     mins  68469 ( );  Mechanical Traction:    0     mins  76504;     Timed Treatment:   25   mins   Total Treatment:     25   mins      Andrew Hughes PT  Physical Therapist

## 2023-09-12 ENCOUNTER — PATIENT MESSAGE (OUTPATIENT)
Dept: INTERNAL MEDICINE | Facility: CLINIC | Age: 48
End: 2023-09-12
Payer: COMMERCIAL

## 2023-09-12 DIAGNOSIS — R19.5 POSITIVE COLORECTAL CANCER SCREENING USING COLOGUARD TEST: Primary | ICD-10-CM

## 2023-09-12 NOTE — TELEPHONE ENCOUNTER
From: Jacquelyn Jeong  To: Dhaval Aguilera  Sent: 9/12/2023 11:12 AM EDT  Subject: + cologuard    I'm going to refer you to have a colonoscopy for further evaluation. Do you have a preferred provider?

## 2023-09-14 ENCOUNTER — TELEPHONE (OUTPATIENT)
Dept: SURGERY | Facility: CLINIC | Age: 48
End: 2023-09-14
Payer: COMMERCIAL

## 2023-09-14 ENCOUNTER — HOSPITAL ENCOUNTER (OUTPATIENT)
Dept: CT IMAGING | Facility: HOSPITAL | Age: 48
Discharge: HOME | End: 2023-09-14
Admitting: NURSE PRACTITIONER
Payer: COMMERCIAL

## 2023-09-14 DIAGNOSIS — F17.210 SMOKING GREATER THAN 20 PACK YEARS: ICD-10-CM

## 2023-09-14 PROCEDURE — 71271 CT THORAX LUNG CANCER SCR C-: CPT

## 2023-09-14 NOTE — TELEPHONE ENCOUNTER
PRESCREENING FOR OPEN ACCESS SCHEDULING    Dhaval Aguilera, 1975  [id number]    09/14/23    If, the patient answers yes to any of the following questions the provider will be informed prior to scheduling open access for approval and documented in the chart.    []  Yes  [x] No    1. Have you ever had a colonoscopy in the past?      When:        Where:       Polyps or other:     []  Yes  [x] No    2. Family history of colon cancer?      Relation:       Age of onset:       Do you currently have any of the following?    []  Yes  [x] No  Rectal bleeding, if so, how long?     []  Yes  [x] No  Abdominal pain, if so, how long?    []  Yes  [x] No  Constipation, if so, how long?    []  Yes  [x] No  Diarrhea, if so, how long?    []  Yes  [x] No  Weight loss, is so, how much?    [] Yes  [x] No  Small caliber stool, if so, how long?      Have you ever had any of the following conditions?    [] Yes  [x] No  Heart attack?      When?       Last cardiac workup?     Blood thinners?    [] Yes  [x] No   Lung problems, asthma or COPD?  [] Yes  [x] No  Oxygen required?       [] Yes  [x] No  Stroke?     [x] Yes  [] No  Have you ever had a reaction to anesthesia? Very nausea & vomiting

## 2023-10-02 ENCOUNTER — TELEPHONE (OUTPATIENT)
Dept: INTERNAL MEDICINE | Facility: CLINIC | Age: 48
End: 2023-10-02
Payer: COMMERCIAL

## 2023-10-02 DIAGNOSIS — R23.2 HOT FLASHES: Primary | ICD-10-CM

## 2023-10-02 DIAGNOSIS — F41.9 ANXIETY: ICD-10-CM

## 2023-10-02 RX ORDER — VENLAFAXINE HYDROCHLORIDE 37.5 MG/1
37.5 CAPSULE, EXTENDED RELEASE ORAL DAILY
Qty: 60 CAPSULE | Refills: 0 | Status: SHIPPED | OUTPATIENT
Start: 2023-10-02

## 2023-10-02 NOTE — TELEPHONE ENCOUNTER
Caller: Dhaval Aguilera    Relationship: Self    Best call back number: [phone]     What medication are you requesting: SOMETHING TO TAKE TO HELP WITH MENOPAUSE AND ANXIETY/ANGER.    What are your current symptoms: ANXIETY AND AGITATION, NIGHT SWEATS AND HOT FLASHES THROUGHOUT THE DAY    How long have you been experiencing symptoms: 2 MONTHS    Have you had these symptoms before:    [] Yes  [x] No    Have you been treated for these symptoms before:   [] Yes  [x] No    If a prescription is needed, what is your preferred pharmacy and phone number: [id number] - [place], [place] - [address]  - [phone] [facility] [phone]      Additional notes:  PATIENT HAS CALLED REQUESTING IF SOMETHING CAN BE CALLED INTO PHARMACY TO HELP CONTROL MENOPAUSE SYMPTOMS AND AGITATION.

## 2023-10-02 NOTE — TELEPHONE ENCOUNTER
Sent venlafaxine (effexor), this medication can be used for hot flashes and anxiety.  The medication does take 6 to 8 weeks to be beneficial so take it every day at the same time. She will need an appointment to follow-up in 6 to 8 weeks, it can be telehealth if she would like

## 2023-10-20 ENCOUNTER — TELEPHONE (OUTPATIENT)
Dept: SURGERY | Facility: CLINIC | Age: 48
End: 2023-10-20

## 2023-10-20 NOTE — TELEPHONE ENCOUNTER
Called patient she hadn't shown for appointment with Dr Helm. Called and spoke with patient she stated that she lost insurance and that she would have to  call back when she gets insurance.No show letter mailed

## 2023-11-30 ENCOUNTER — PATIENT MESSAGE (OUTPATIENT)
Dept: INTERNAL MEDICINE | Facility: CLINIC | Age: 48
End: 2023-11-30

## 2023-11-30 DIAGNOSIS — F41.9 ANXIETY: ICD-10-CM

## 2023-11-30 DIAGNOSIS — R23.2 HOT FLASHES: ICD-10-CM

## 2023-11-30 RX ORDER — VENLAFAXINE HYDROCHLORIDE 37.5 MG/1
37.5 CAPSULE, EXTENDED RELEASE ORAL DAILY
Qty: 30 CAPSULE | Refills: 0 | Status: SHIPPED | OUTPATIENT
Start: 2023-11-30

## 2023-11-30 NOTE — TELEPHONE ENCOUNTER
From: Dhaval Aguilera  To: Lillie Mcdowell  Sent: 11/30/2023 8:39 AM EST  Subject: Refill    Can you please send a refill of the venlafaxine thank you     Dhaval Aguilera

## 2023-12-04 ENCOUNTER — OFFICE VISIT (OUTPATIENT)
Dept: INTERNAL MEDICINE | Facility: CLINIC | Age: 48
End: 2023-12-04
Payer: MEDICAID

## 2023-12-04 VITALS
OXYGEN SATURATION: 96 % | TEMPERATURE: 97.5 F | SYSTOLIC BLOOD PRESSURE: 128 MMHG | WEIGHT: 215 LBS | BODY MASS INDEX: 34.55 KG/M2 | HEART RATE: 106 BPM | DIASTOLIC BLOOD PRESSURE: 82 MMHG | HEIGHT: 66 IN

## 2023-12-04 DIAGNOSIS — F41.9 ANXIETY: Primary | ICD-10-CM

## 2023-12-04 DIAGNOSIS — R23.2 HOT FLASHES: ICD-10-CM

## 2023-12-04 PROCEDURE — 99214 OFFICE O/P EST MOD 30 MIN: CPT | Performed by: NURSE PRACTITIONER

## 2023-12-04 RX ORDER — CHOLECALCIFEROL (VITAMIN D3) 1250 MCG
CAPSULE ORAL
COMMUNITY
Start: 2023-09-25

## 2023-12-04 NOTE — PROGRESS NOTES
Acute Office Visit      Date: 2023   Patient Name: Dhaval Aguilera  : 1975   MRN: [id number]     Chief Complaint:    Chief Complaint   Patient presents with    Follow-up     For hot flashes and anxiety       History of Present Illness: Dhaval Aguilera is a 48 y.o. female who presents today for a follow-up on anxiety and hot flashes.    The patient's anxiety and aggression have improved. She is taking Effexor and is doing well with it. Her daughter-in-law sent her a link for a hemp-based product, and she is not sure if she wants to do that because she is not working right now. If she tests positive for THC, she will probably not get a job. Taking Effexor is helping her, as she does not get aggravated.  She lost her composure at work a few times and doesn't like being this way.     The patient's hot flashes have not improved. Her hot flashes occur randomly; sometimes she gets them for several hours, and sometimes she is asymptomatic for several hours. Sometimes, it wakes her up in the middle of the night. She heard about black cohosh but has not tried it. She gets about 10 to 15 episodes of hot flashes daily. She prefers to try black cohosh first before starting any other medication.    She is a smoker.  Not interested in quitting or anything to help with this today.       Subjective      Review of Systems:   Pertinent ROS noted in HPI.     I have reviewed the patients family history, social history, past medical history, past surgical history and have updated it as appropriate.     Medications:     Current Outpatient Medications:     Cholecalciferol (Vitamin D3) 1.25 MG (63931 UT) capsule, , Disp: , Rfl:     Cyanocobalamin (Vitamin B12) 1000 MCG tablet controlled-release, , Disp: , Rfl:     NAPROXEN PO, Take  by mouth., Disp: , Rfl:     venlafaxine XR (Effexor XR) 37.5 MG 24 hr capsule, Take 1 capsule by mouth Daily., Disp: 30 capsule, Rfl: 0    Allergies:   No Known Allergies    Objective  "    Physical Exam  Physical Exam  Vitals and nursing note reviewed.   Constitutional:       General: She is not in acute distress.     Appearance: Normal appearance. She is not diaphoretic.   HENT:      Right Ear: External ear normal.      Left Ear: External ear normal.   Eyes:      Extraocular Movements: Extraocular movements intact.   Cardiovascular:      Rate and Rhythm: Normal rate.   Pulmonary:      Effort: Pulmonary effort is normal.   Musculoskeletal:         General: Normal range of motion.      Cervical back: Normal range of motion.   Skin:     General: Skin is dry.   Neurological:      General: No focal deficit present.      Mental Status: She is alert and oriented to person, place, and time.   Psychiatric:         Mood and Affect: Mood normal.         Behavior: Behavior normal.         Thought Content: Thought content normal.         Vital Signs:   Vitals:    12/04/23 0834   BP: 128/82   Pulse: 106   Temp: 97.5 °F (36.4 °C)   SpO2: 96%   Weight: 97.5 kg (215 lb)   Height: 167.6 cm (65.98\")          Body mass index is 34.72 kg/m².      Assessment / Plan      Assessment/Plan:   Problem List Items Addressed This Visit    None  Visit Diagnoses       Anxiety    -  Primary    Hot flashes                  1. Hot flashes  The patient is going to try black cohosh or red clover relief over the counter for hot flashes. If this is not effective, we can initiate Veozah. Could also try other SSRIs but effexor is helping with mood so continue for now. If these options are not beneficial, she will need to see gynecology for possible hormone treatment, but the patient is a smoker and is not a great candidate for estrogen therapy.    2. Anxiety  Stable on Effexor; continue as directed.    Follow Up:   Return if symptoms worsen or fail to improve.        Lillie MCWILLIAMS  [facility]      Transcribed from ambient dictation for POPPY Gonsalez by Simon Toth.  12/04/23 "   10:30 EST    Patient or patient representative verbalized consent to the visit recording.  I have personally performed the services described in this document as transcribed by the above individual, and it is both accurate and complete.

## 2023-12-27 DIAGNOSIS — R23.2 HOT FLASHES: ICD-10-CM

## 2023-12-27 DIAGNOSIS — F41.9 ANXIETY: ICD-10-CM

## 2023-12-27 RX ORDER — VENLAFAXINE HYDROCHLORIDE 37.5 MG/1
37.5 CAPSULE, EXTENDED RELEASE ORAL DAILY
Qty: 30 CAPSULE | Refills: 0 | OUTPATIENT
Start: 2023-12-27

## 2023-12-27 RX ORDER — VENLAFAXINE HYDROCHLORIDE 37.5 MG/1
37.5 CAPSULE, EXTENDED RELEASE ORAL DAILY
Qty: 30 CAPSULE | Refills: 0 | Status: SHIPPED | OUTPATIENT
Start: 2023-12-27

## 2023-12-28 RX ORDER — VENLAFAXINE HYDROCHLORIDE 37.5 MG/1
37.5 CAPSULE, EXTENDED RELEASE ORAL DAILY
Qty: 30 CAPSULE | Refills: 0 | OUTPATIENT
Start: 2023-12-28

## 2024-01-28 DIAGNOSIS — R23.2 HOT FLASHES: ICD-10-CM

## 2024-01-28 DIAGNOSIS — F41.9 ANXIETY: ICD-10-CM

## 2024-01-29 RX ORDER — VENLAFAXINE HYDROCHLORIDE 37.5 MG/1
37.5 CAPSULE, EXTENDED RELEASE ORAL DAILY
Qty: 90 CAPSULE | Refills: 1 | Status: SHIPPED | OUTPATIENT
Start: 2024-01-29

## 2024-04-23 ENCOUNTER — HOSPITAL ENCOUNTER (EMERGENCY)
Facility: HOSPITAL | Age: 49
Discharge: HOME | End: 2024-04-23
Attending: STUDENT IN AN ORGANIZED HEALTH CARE EDUCATION/TRAINING PROGRAM | Admitting: EMERGENCY MEDICINE
Payer: MEDICAID

## 2024-04-23 ENCOUNTER — TELEPHONE (OUTPATIENT)
Dept: INTERNAL MEDICINE | Facility: CLINIC | Age: 49
End: 2024-04-23
Payer: MEDICAID

## 2024-04-23 VITALS
OXYGEN SATURATION: 96 % | BODY MASS INDEX: 34.94 KG/M2 | RESPIRATION RATE: 14 BRPM | TEMPERATURE: 98 F | HEART RATE: 108 BPM | SYSTOLIC BLOOD PRESSURE: 145 MMHG | HEIGHT: 67 IN | DIASTOLIC BLOOD PRESSURE: 102 MMHG | WEIGHT: 222.6 LBS

## 2024-04-23 DIAGNOSIS — B97.89 VIRAL RESPIRATORY ILLNESS: Primary | ICD-10-CM

## 2024-04-23 DIAGNOSIS — J98.8 VIRAL RESPIRATORY ILLNESS: Primary | ICD-10-CM

## 2024-04-23 LAB
FLUAV RNA RESP QL NAA+PROBE: NOT DETECTED
FLUBV RNA RESP QL NAA+PROBE: NOT DETECTED
RSV RNA RESP QL NAA+PROBE: NOT DETECTED
SARS-COV-2 RNA RESP QL NAA+PROBE: NOT DETECTED

## 2024-04-23 PROCEDURE — 87637 SARSCOV2&INF A&B&RSV AMP PRB: CPT | Performed by: STUDENT IN AN ORGANIZED HEALTH CARE EDUCATION/TRAINING PROGRAM

## 2024-04-23 PROCEDURE — 99283 EMERGENCY DEPT VISIT LOW MDM: CPT

## 2024-04-23 NOTE — TELEPHONE ENCOUNTER
Caller: Dhaval Aguilera    Relationship to patient: Self    Best call back number: [phone]     Chief complaint: CHEST TIGHTNESS    Patient directed to call 911 or go to their nearest emergency room.     Patient verbalized understanding: [x] Yes  [] No  If no, why?

## 2024-04-23 NOTE — Clinical Note
[facility] EMERGENCY DEPARTMENT  [address] [address]  Phone: [phone]    Dhaval Aguilera was seen and treated in our emergency department on 4/23/2024.  She may return to work on 04/25/2024.         Thank you for choosing [facility].    Gonzales Paiz, DO

## 2024-04-23 NOTE — ED PROVIDER NOTES
Pt Name: Dhaval Aguilera  MRN: [id number]  : 1975  Date of Encounter: 2024    PCP: Lillie Mcdowell APRN      Subjective    History of Present Illness:    Chief Complaint: Cough congestion body aches malaise    History of Present Illness: Dhaval Aguilera is a 49 y.o. female who presents to the ER complaining of cough congestion body aches malaise that has been ongoing since early this morning and has progressively worsened throughout the day.  Patient states she has been around someone who had flulike symptoms a couple days ago.        Nurses Notes reviewed and agree, including vitals, allergies, social history and prior medical history.       Allergies:    Patient has no known allergies.    History reviewed. No pertinent past medical history.    Past Surgical History:   Procedure Laterality Date    SUBTOTAL HYSTERECTOMY  20    TUBAL ABDOMINAL LIGATION  97       Social History     Socioeconomic History    Marital status:    Tobacco Use    Smoking status: Every Day     Current packs/day: 1.00     Average packs/day: 1 pack/day for 41.0 years (41.0 ttl pk-yrs)     Types: Cigarettes     Start date: 1983    Smokeless tobacco: Never   Vaping Use    Vaping status: Never Used   Substance and Sexual Activity    Alcohol use: Not Currently    Drug use: Never    Sexual activity: Not Currently     Partners: Male     Birth control/protection: Hysterectomy       Family History   Problem Relation Age of Onset    Alcohol abuse Mother     Asthma Mother     Liver disease Mother     Alcohol abuse Maternal Grandfather     COPD Maternal Grandmother     Alcohol abuse Paternal Grandfather     Stroke Paternal Grandfather     Alcohol abuse Brother     Alcohol abuse Maternal Aunt     Drug abuse Son     Drug abuse Son        REVIEW OF SYSTEMS:     All systems reviewed and not pertinent unless noted.    Review of Systems   Constitutional:  Positive for fatigue.   HENT:  Positive for congestion.    Respiratory:   Positive for cough.        Objective    Physical Exam  Vitals and nursing note reviewed.   Constitutional:       Appearance: Normal appearance.   HENT:      Head: Normocephalic and atraumatic.   Eyes:      Extraocular Movements: Extraocular movements intact.      Pupils: Pupils are equal, round, and reactive to light.   Cardiovascular:      Rate and Rhythm: Normal rate and regular rhythm.      Pulses: Normal pulses.      Heart sounds: Normal heart sounds.   Pulmonary:      Effort: Pulmonary effort is normal.      Breath sounds: Normal breath sounds.   Abdominal:      General: Abdomen is flat. Bowel sounds are normal.      Palpations: Abdomen is soft.   Musculoskeletal:      Cervical back: Normal range of motion and neck supple.   Skin:     Capillary Refill: Capillary refill takes less than 2 seconds.   Neurological:      General: No focal deficit present.      Mental Status: She is alert and oriented to person, place, and time. Mental status is at baseline.      GCS: GCS eye subscore is 4. GCS verbal subscore is 5. GCS motor subscore is 6.      Sensory: Sensation is intact.      Motor: Motor function is intact.      Gait: Gait is intact.   Psychiatric:         Attention and Perception: Attention and perception normal.         Mood and Affect: Mood and affect normal.         Speech: Speech normal.         Behavior: Behavior normal. Behavior is cooperative.               Procedures    ED Course:         LAB Results:    Lab Results (last 24 hours)       Procedure Component Value Units Date/Time    COVID-19, FLU A/B, RSV PCR 1 HR TAT - Swab, Nasopharynx [[id number]]  (Normal) Collected: 04/23/24 1406    Specimen: Swab from Nasopharynx Updated: 04/23/24 1453     COVID19 Not Detected     Influenza A PCR Not Detected     Influenza B PCR Not Detected     RSV, PCR Not Detected    Narrative:      Fact sheet for providers: https://www.fda.gov/media/743853/download    Fact sheet for patients:  https://www.fda.gov/media/177292/download    Test performed by PCR.             If labs were ordered, I have independently reviewed the results and considered them in the diagnosis and treatment plan for the patient    RADIOLOGY    No radiology results from the last 24 hrs     If I have ordered, I have independently reviewed the above noted radiographic studies.  Please see the radiologist dictation for the official interpretation    Medications given to patient in the ER    Medications - No data to display              Shared Decision Making: After my consideration the clinical presentation and laboratory/radiology studies obtained, I discussed the findings with the patient/patient representative who is in agreement with the treatment plan and final disposition. Risks and benefits of discharge and/or observation admission were discussed.  Final disposition of the patient will be discharged home request patient follow-up with primary care provider neck 7 days for reevaluation.    Medical Decision Making  Dhaval Aguilera is a 49 y.o. female who presents to the ER complaining of cough congestion body aches malaise that has been ongoing since early this morning and has progressively worsened throughout the day.  Patient states she has been around someone who had flulike symptoms a couple days ago.    DDX: includes but is not limited to: COVID-19, influenza, viral respiratory illness    Problems Addressed:  Viral respiratory illness: acute illness or injury    Amount and/or Complexity of Data Reviewed  Labs: ordered. Decision-making details documented in ED Course.     Details: I have personally reviewed and documented all results  Discussion of management or test interpretation with external provider(s): Discussed assessment, treatment and plan with ER attending    Risk  Risk Details: I have discussed with patient the finding of the test preformed today. Patient has been diagnosed with viral respiratory illness and will  be discharged home.  Patient requested to follow-up with primary care provider within the next 7 days for reevaluation. Strict return precautions have been given and patient verbalizes understanding          Final diagnoses:   Viral respiratory illness         Please note that portions of this document were completed using voice recognition dictation software.       Naseem Bautista, APRN  04/23/24 1928

## 2024-05-10 ENCOUNTER — OFFICE VISIT (OUTPATIENT)
Dept: INTERNAL MEDICINE | Facility: CLINIC | Age: 49
End: 2024-05-10
Payer: COMMERCIAL

## 2024-05-10 VITALS
HEART RATE: 93 BPM | BODY MASS INDEX: 33.9 KG/M2 | SYSTOLIC BLOOD PRESSURE: 142 MMHG | DIASTOLIC BLOOD PRESSURE: 82 MMHG | OXYGEN SATURATION: 96 % | HEIGHT: 67 IN | WEIGHT: 216 LBS | TEMPERATURE: 97.3 F

## 2024-05-10 DIAGNOSIS — T14.8XXD WOUND HEALING, DELAYED: ICD-10-CM

## 2024-05-10 DIAGNOSIS — M77.32 CALCANEAL SPUR OF FOOT, LEFT: ICD-10-CM

## 2024-05-10 DIAGNOSIS — M79.672 LEFT FOOT PAIN: Primary | ICD-10-CM

## 2024-05-10 DIAGNOSIS — R19.5 POSITIVE COLORECTAL CANCER SCREENING USING COLOGUARD TEST: ICD-10-CM

## 2024-05-10 RX ORDER — MELOXICAM 15 MG/1
15 TABLET ORAL DAILY
Qty: 30 TABLET | Refills: 0 | Status: SHIPPED | OUTPATIENT
Start: 2024-05-10

## 2024-05-10 RX ORDER — GRAPE SEED EXT/BIOFLAV,CITRUS 50MG-250MG
CAPSULE ORAL
COMMUNITY
Start: 2023-11-01

## 2024-05-11 LAB
BUN SERPL-MCNC: 11 MG/DL (ref 6–20)
BUN/CREAT SERPL: 11.7 (ref 7–25)
CALCIUM SERPL-MCNC: 9.1 MG/DL (ref 8.6–10.5)
CHLORIDE SERPL-SCNC: 104 MMOL/L (ref 98–107)
CO2 SERPL-SCNC: 27.7 MMOL/L (ref 22–29)
CREAT SERPL-MCNC: 0.94 MG/DL (ref 0.57–1)
EGFRCR SERPLBLD CKD-EPI 2021: 73.6 ML/MIN/1.73
GLUCOSE SERPL-MCNC: 88 MG/DL (ref 65–99)
HBA1C MFR BLD: 5.5 % (ref 4.8–5.6)
POTASSIUM SERPL-SCNC: 4.5 MMOL/L (ref 3.5–5.2)
SODIUM SERPL-SCNC: 142 MMOL/L (ref 136–145)

## 2024-05-16 ENCOUNTER — TELEPHONE (OUTPATIENT)
Dept: SURGERY | Facility: CLINIC | Age: 49
End: 2024-05-16
Payer: COMMERCIAL

## 2024-05-16 RX ORDER — BISACODYL 5 MG/1
TABLET, DELAYED RELEASE ORAL
Qty: 4 TABLET | Refills: 0 | Status: SHIPPED | OUTPATIENT
Start: 2024-05-16

## 2024-05-16 RX ORDER — POLYETHYLENE GLYCOL 3350 17 G/17G
POWDER, FOR SOLUTION ORAL
Qty: 238 G | Refills: 0 | Status: SHIPPED | OUTPATIENT
Start: 2024-05-16

## 2024-05-16 NOTE — TELEPHONE ENCOUNTER
Pt would like to be scheduled at [facility] with Dr. Sosa on 07/05. Verified Pharmacy/insurance. Thank you.       Pt needed a Friday for this procedure due to transportation.

## 2024-05-16 NOTE — TELEPHONE ENCOUNTER
PRESCREENING FOR OPEN ACCESS SCHEDULING    Dhaval Aguilera, 1975  [id number]    05/16/24    If, the patient answers yes to any of the following questions the provider will be informed prior to scheduling open access for approval and documented in the chart.    []  Yes  [x] No    1. Have you ever had a colonoscopy in the past?      When:        Where:       Polyps or other:     []  Yes  [x] No    2. Family history of colon cancer?      Relation:       Age of onset:       Do you currently have any of the following?    []  Yes  [x] No  Rectal bleeding, if so, how long?     []  Yes  [x] No  Abdominal pain, if so, how long?    []  Yes  [x] No  Constipation, if so, how long?    []  Yes  [x] No  Diarrhea, if so, how long?    []  Yes  [x] No  Weight loss, is so, how much?    [] Yes  [x] No  Small caliber stool, if so, how long?    []Yes  [x] No  Do you have Hemorroids?    []Yes  [x] No  Have you been diagnosed with Anemia?    []Yes  [x] No  Do you have difficulty swallowing?    [x]Yes  [] No  Do you have acid reflux? Takes medication as needed    Have you ever had any of the following conditions?    [] Yes  [x] No  Heart attack?      When?       Last cardiac workup?     Blood thinners?    [] Yes  [x] No   Lung problems, asthma or COPD?  [] Yes  [x] No  Oxygen required?       [] Yes  [x] No  Stroke?     [] Yes  [x] No  Have you ever had a reaction to anesthesia?

## 2024-05-17 ENCOUNTER — PREP FOR SURGERY (OUTPATIENT)
Dept: OTHER | Facility: HOSPITAL | Age: 49
End: 2024-05-17
Payer: COMMERCIAL

## 2024-05-17 DIAGNOSIS — Z12.11 SCREENING FOR COLON CANCER: Primary | ICD-10-CM

## 2024-06-17 ENCOUNTER — TELEPHONE (OUTPATIENT)
Dept: SURGERY | Facility: CLINIC | Age: 49
End: 2024-06-17

## 2024-07-24 DIAGNOSIS — R23.2 HOT FLASHES: ICD-10-CM

## 2024-07-24 DIAGNOSIS — M79.672 LEFT FOOT PAIN: ICD-10-CM

## 2024-07-24 DIAGNOSIS — M77.32 CALCANEAL SPUR OF FOOT, LEFT: ICD-10-CM

## 2024-07-24 DIAGNOSIS — F41.9 ANXIETY: ICD-10-CM

## 2024-07-24 RX ORDER — MELOXICAM 15 MG/1
15 TABLET ORAL DAILY
Qty: 30 TABLET | Refills: 0 | Status: SHIPPED | OUTPATIENT
Start: 2024-07-24

## 2024-07-24 RX ORDER — VENLAFAXINE HYDROCHLORIDE 37.5 MG/1
37.5 CAPSULE, EXTENDED RELEASE ORAL DAILY
Qty: 90 CAPSULE | Refills: 1 | Status: SHIPPED | OUTPATIENT
Start: 2024-07-24

## 2024-08-24 DIAGNOSIS — M77.32 CALCANEAL SPUR OF FOOT, LEFT: ICD-10-CM

## 2024-08-24 DIAGNOSIS — M79.672 LEFT FOOT PAIN: ICD-10-CM

## 2024-08-24 RX ORDER — MELOXICAM 15 MG/1
15 TABLET ORAL DAILY
Qty: 30 TABLET | Refills: 0 | Status: SHIPPED | OUTPATIENT
Start: 2024-08-24

## 2024-10-02 ENCOUNTER — APPOINTMENT (OUTPATIENT)
Dept: GENERAL RADIOLOGY | Facility: HOSPITAL | Age: 49
End: 2024-10-02
Payer: MEDICAID

## 2024-10-02 ENCOUNTER — HOSPITAL ENCOUNTER (EMERGENCY)
Facility: HOSPITAL | Age: 49
Discharge: HOME | End: 2024-10-02
Attending: STUDENT IN AN ORGANIZED HEALTH CARE EDUCATION/TRAINING PROGRAM
Payer: MEDICAID

## 2024-10-02 VITALS
BODY MASS INDEX: 34.53 KG/M2 | DIASTOLIC BLOOD PRESSURE: 86 MMHG | TEMPERATURE: 98.2 F | SYSTOLIC BLOOD PRESSURE: 154 MMHG | WEIGHT: 220 LBS | HEART RATE: 90 BPM | RESPIRATION RATE: 18 BRPM | HEIGHT: 67 IN | OXYGEN SATURATION: 95 %

## 2024-10-02 DIAGNOSIS — S89.91XA INJURY OF RIGHT KNEE, INITIAL ENCOUNTER: Primary | ICD-10-CM

## 2024-10-02 PROCEDURE — 25010000002 KETOROLAC TROMETHAMINE PER 15 MG

## 2024-10-02 PROCEDURE — 73562 X-RAY EXAM OF KNEE 3: CPT

## 2024-10-02 PROCEDURE — 96372 THER/PROPH/DIAG INJ SC/IM: CPT

## 2024-10-02 PROCEDURE — 99283 EMERGENCY DEPT VISIT LOW MDM: CPT

## 2024-10-02 RX ORDER — KETOROLAC TROMETHAMINE 30 MG/ML
15 INJECTION, SOLUTION INTRAMUSCULAR; INTRAVENOUS ONCE
Status: COMPLETED | OUTPATIENT
Start: 2024-10-02 | End: 2024-10-02

## 2024-10-02 RX ADMIN — KETOROLAC TROMETHAMINE 15 MG: 30 INJECTION, SOLUTION INTRAMUSCULAR; INTRAVENOUS at 13:14

## 2024-10-02 NOTE — ED PROVIDER NOTES
Subjective  History of Present Illness:    This is a 49-year-old female presenting for evaluation of right knee pain after loading a heavy pallet on Saturday, patient was running of knee brace but no relief.  She reports that she had a twisting mechanism of injury and felt a pop, has had tenderness to the medial aspect of the knee since.  She is ambulatory.  No acute distress on arrival.      Nurses Notes reviewed and agree, including vitals, allergies, social history and prior medical history.     REVIEW OF SYSTEMS: All systems reviewed and not pertinent unless noted.  Review of Systems   Constitutional:  Negative for fever.   Musculoskeletal:  Positive for arthralgias.   All other systems reviewed and are negative.      History reviewed. No pertinent past medical history.    Allergies:    Patient has no known allergies.      Past Surgical History:   Procedure Laterality Date    SUBTOTAL HYSTERECTOMY  12/09/20    TUBAL ABDOMINAL LIGATION  01/18/97         Social History     Socioeconomic History    Marital status:    Tobacco Use    Smoking status: Every Day     Current packs/day: 1.00     Average packs/day: 1 pack/day for 41.5 years (41.5 ttl pk-yrs)     Types: Cigarettes     Start date: 4/14/1983    Smokeless tobacco: Never   Vaping Use    Vaping status: Never Used   Substance and Sexual Activity    Alcohol use: Not Currently    Drug use: Never    Sexual activity: Not Currently     Partners: Male     Birth control/protection: Hysterectomy         Family History   Problem Relation Age of Onset    Alcohol abuse Mother     Asthma Mother     Liver disease Mother     Alcohol abuse Maternal Grandfather     COPD Maternal Grandmother     Alcohol abuse Paternal Grandfather     Stroke Paternal Grandfather     Alcohol abuse Brother     Alcohol abuse Maternal Aunt     Drug abuse Son     Drug abuse Son        Objective  Physical Exam:  /86 (BP Location: Left arm, Patient Position: Sitting)   Pulse 90   Temp 98.2  "°F (36.8 °C) (Oral)   Resp 18   Ht 170.2 cm (67\")   Wt 99.8 kg (220 lb)   SpO2 95%   BMI 34.46 kg/m²      Physical Exam  Vitals and nursing note reviewed.   Constitutional:       General: She is not in acute distress.     Appearance: Normal appearance. She is normal weight. She is not ill-appearing, toxic-appearing or diaphoretic.   HENT:      Head: Normocephalic and atraumatic.      Nose: Nose normal.      Mouth/Throat:      Mouth: Mucous membranes are moist.      Pharynx: Oropharynx is clear.   Eyes:      Extraocular Movements: Extraocular movements intact.   Cardiovascular:      Pulses: Normal pulses.      Comments: Appears well-perfused  Pulmonary:      Effort: Pulmonary effort is normal.   Abdominal:      General: Abdomen is flat.   Musculoskeletal:         General: Swelling and tenderness present. No deformity. Normal range of motion.      Cervical back: Normal range of motion.   Skin:     General: Skin is warm and dry.      Capillary Refill: Capillary refill takes less than 2 seconds.      Findings: No bruising.   Neurological:      General: No focal deficit present.      Mental Status: She is alert and oriented to person, place, and time.   Psychiatric:         Mood and Affect: Mood normal.         Behavior: Behavior normal.         Thought Content: Thought content normal.         Judgment: Judgment normal.             Procedures    ED Course:    ED Course as of 10/02/24 1359   Wed Oct 02, 2024   1359 XR Knee 3 View Right [JR]      ED Course User Index  [JR] Arash Martinez PA-C       Lab Results (last 24 hours)       ** No results found for the last 24 hours. **             XR Knee 3 View Right    Result Date: 10/2/2024   PROCEDURE: XR KNEE 3 VW RIGHT-  HISTORY: knee pain after injury  COMPARISON: None.  FINDINGS:  A three view exam demonstrates no acute fracture or dislocation. The joint spaces demonstrate a tiny superior patellar osteophyte. There is mild narrowing of the lateral joint compartment " as well as the patellofemoral compartment. No soft tissue abnormality is seen.       Impression: No acute bony abnormality.  Degenerative changes described.      This report was signed and finalized on 10/2/2024 1:50 PM by Taina Kruse MD.          MDM     Amount and/or Complexity of Data Reviewed  Tests in the radiology section of CPT®: reviewed        Initial impression of presenting illness: This is a 49-year-old female presenting for evaluation of right knee pain after twisting mechanism while loading a heavy pallet and feeling a pop, has worn a knee brace with little relief    DDX: includes but is not limited to: Strain sprain contusion fracture meniscal injury, ligament or tendon injury, subluxation, others    Patient arrives hemodynamically stable afebrile nontachycardic not giving nonhypoxic with vitals interpreted by myself.     Pertinent features from physical exam: Tenderness to palpation of the medial and anterior aspect of the right knee, no significant instability palpated.  She is able to stand and bear weight, neurovascular tact, sensation intact.  No color changes or cellulitic changes of the right knee..  She does have swelling of the right medial knee.  No deep venous system tenderness.    Initial diagnostic plan: Right knee x-ray    Results from initial plan were reviewed and interpreted by me revealing no acute fracture or dislocation per my independent interpretation.    Diagnostic information from other sources: Record reviewed    Interventions / Re-evaluation: Toradol.  Continue knee brace.  Stable for discharge.    Results/clinical rationale were discussed with patient at bedside    Consultations/Discussion of results with other physicians: N/A    Disposition plan: Discharge, Ortho follow-up, recommended crutches but patient declined.  Continue hinged knee brace that the patient already has.  Elevate and ice.  Will place ambulatory referral to orthopedics.  -----    Final diagnoses:   Injury  of right knee, initial encounter          Arash Martinez PA-C  10/02/24 1400       Arash Martinez PA-C  10/02/24 1402

## 2024-10-02 NOTE — DISCHARGE INSTRUCTIONS
Follow-up with the orthopedic provider, the number has been attached.  A referral has been placed, elevate and ice, continue naproxen or Mobic as needed.  You can additionally use Tylenol as needed for pain but no more than 3000 mg daily.  Wear hinged knee brace for support.  Use crutches as needed for ambulation.

## 2024-10-02 NOTE — Clinical Note
[facility] EMERGENCY DEPARTMENT  [address] [address]  Phone: [phone]    Dhaval Aguilera was seen and treated in our emergency department on 10/2/2024.  She may return to work on 10/07/2024.         Thank you for choosing [facility].    Arash Martinez PA-C

## 2024-11-14 ENCOUNTER — HOSPITAL ENCOUNTER (OUTPATIENT)
Dept: GENERAL RADIOLOGY | Facility: HOSPITAL | Age: 49
Discharge: HOME | End: 2024-11-14
Admitting: NURSE PRACTITIONER
Payer: COMMERCIAL

## 2024-11-14 ENCOUNTER — OFFICE VISIT (OUTPATIENT)
Dept: INTERNAL MEDICINE | Facility: CLINIC | Age: 49
End: 2024-11-14
Payer: COMMERCIAL

## 2024-11-14 VITALS
TEMPERATURE: 97.2 F | DIASTOLIC BLOOD PRESSURE: 82 MMHG | HEIGHT: 67 IN | WEIGHT: 220 LBS | HEART RATE: 77 BPM | BODY MASS INDEX: 34.53 KG/M2 | OXYGEN SATURATION: 95 % | SYSTOLIC BLOOD PRESSURE: 148 MMHG

## 2024-11-14 DIAGNOSIS — Z23 NEED FOR INFLUENZA VACCINATION: ICD-10-CM

## 2024-11-14 DIAGNOSIS — I10 PRIMARY HYPERTENSION: ICD-10-CM

## 2024-11-14 DIAGNOSIS — M25.531 RIGHT WRIST PAIN: ICD-10-CM

## 2024-11-14 DIAGNOSIS — Z72.0 NICOTINE USE: ICD-10-CM

## 2024-11-14 DIAGNOSIS — M67.40 GANGLION CYST: Primary | ICD-10-CM

## 2024-11-14 PROCEDURE — 99214 OFFICE O/P EST MOD 30 MIN: CPT | Performed by: NURSE PRACTITIONER

## 2024-11-14 PROCEDURE — 90656 IIV3 VACC NO PRSV 0.5 ML IM: CPT | Performed by: NURSE PRACTITIONER

## 2024-11-14 PROCEDURE — 73110 X-RAY EXAM OF WRIST: CPT

## 2024-11-14 PROCEDURE — 90471 IMMUNIZATION ADMIN: CPT | Performed by: NURSE PRACTITIONER

## 2024-11-14 RX ORDER — NAPROXEN 250 MG/1
TABLET ORAL
COMMUNITY
Start: 2023-10-01

## 2024-11-14 RX ORDER — DIPHENOXYLATE HYDROCHLORIDE AND ATROPINE SULFATE 2.5; .025 MG/1; MG/1
TABLET ORAL
COMMUNITY
Start: 2021-11-07

## 2024-11-14 RX ORDER — MELOXICAM 15 MG/1
15 TABLET ORAL DAILY
Qty: 30 TABLET | Refills: 0 | Status: SHIPPED | OUTPATIENT
Start: 2024-11-14

## 2024-11-14 NOTE — PROGRESS NOTES
Office Visit      Date: 2024   Patient Name: Dhaval Aguilera  : 1975   MRN: [id number]     Chief Complaint:    Chief Complaint   Patient presents with    Cyst     Knot on right wrist for about 2 weeks.       History of Present Illness: Dhaval Aguilera is a 49 y.o. female.    Answers submitted by the patient for this visit:  Other (Submitted on 2024)  Please describe your symptoms.: Have a hard bump on my right arm just next to my wrist that is bothering me and I have a small bit of pain with it  Have you had these symptoms before?: No  How long have you been having these symptoms?: 5-7 days  Please list any medications you are currently taking for this condition.: N/A  Please describe any probable cause for these symptoms. : Unknown  Primary Reason for Visit (Submitted on 2024)  What is the primary reason for your visit?: Problem Not Listed    Subjective      Review of Systems:   Pertinent ROS noted in HPI.     I have reviewed the patients family history, social history, past medical history, past surgical history and have updated it as appropriate.     Medications:     Current Outpatient Medications:     Black Cohosh 540 MG capsule, , Disp: , Rfl:     Cetirizine HCl (ZYRTEC PO), Take  by mouth., Disp: , Rfl:     meloxicam (MOBIC) 15 MG tablet, Take 1 tablet by mouth Daily., Disp: 30 tablet, Rfl: 0    multivitamin (Multi-Vitamin Daily) tablet tablet, , Disp: , Rfl:     naproxen (NAPROSYN) 250 MG tablet, , Disp: , Rfl:     venlafaxine XR (EFFEXOR-XR) 37.5 MG 24 hr capsule, Take 1 capsule by mouth Daily., Disp: 90 capsule, Rfl: 1    Allergies:   No Known Allergies    Objective     Physical Exam  Vitals and nursing note reviewed.   Constitutional:       Appearance: Normal appearance.   HENT:      Right Ear: External ear normal.      Left Ear: External ear normal.   Eyes:      Extraocular Movements: Extraocular movements intact.   Cardiovascular:      Rate and Rhythm: Normal rate.   Pulmonary:  "     Effort: Pulmonary effort is normal.   Musculoskeletal:         General: Normal range of motion.      Right wrist: Deformity (medial right wrist- small lump palpated, tender, moveable. No erythema, warmth) and tenderness present. No swelling, effusion or crepitus. Normal range of motion. Normal pulse.      Cervical back: Normal range of motion.   Skin:     General: Skin is dry.   Neurological:      Mental Status: She is alert and oriented to person, place, and time.   Psychiatric:         Mood and Affect: Mood normal.         Vital Signs:   Vitals:    11/14/24 1043   BP: 148/82   Pulse: 77   Temp: 97.2 °F (36.2 °C)   SpO2: 95%   Weight: 99.8 kg (220 lb)   Height: 170.2 cm (67\")     Body mass index is 34.46 kg/m².      Assessment / Plan      Assessment/Plan:   Diagnoses and all orders for this visit:    1. Ganglion cyst (Primary)  -     XR wrist 3+ vw right  -     meloxicam (MOBIC) 15 MG tablet; Take 1 tablet by mouth Daily.  Dispense: 30 tablet; Refill: 0    2. Right wrist pain  -     XR wrist 3+ vw right  -     meloxicam (MOBIC) 15 MG tablet; Take 1 tablet by mouth Daily.  Dispense: 30 tablet; Refill: 0    3. Primary hypertension    4. Nicotine use    5. Need for influenza vaccination  -     Fluzone >6mos ([id number])       Suspect ganglion cyst  Conservative measures; RICE, refill meloxicam to take daily PRN with food, avoid other NSAIDs   Pt requesting XR to rule out arthritis/bony abnormality, ordered  If persists/worsens refer to ortho     Blood pressure is elevated today.  Recommend less than 1500 mg of sodium daily, 150 minutes of exercise per week, cease nicotine use, work on weight loss.  Patient will work on these things, will f/u for physical if continues to be elevated will start treatment.       Follow Up:   Return in about 2 weeks (around 11/28/2024) for Annual Physical.      Lillie MCWILLIAMS  [facility]  "

## 2024-11-19 NOTE — PROGRESS NOTES
XR of wrist normal. Suspect what we discussed: ganglion cyst. Continue conservative measures if worsens let me know can refer to ortho

## 2024-11-26 ENCOUNTER — HOSPITAL ENCOUNTER (EMERGENCY)
Facility: HOSPITAL | Age: 49
Discharge: HOME | End: 2024-11-26
Attending: EMERGENCY MEDICINE
Payer: COMMERCIAL

## 2024-11-26 ENCOUNTER — APPOINTMENT (OUTPATIENT)
Dept: GENERAL RADIOLOGY | Facility: HOSPITAL | Age: 49
End: 2024-11-26
Payer: COMMERCIAL

## 2024-11-26 VITALS
RESPIRATION RATE: 20 BRPM | SYSTOLIC BLOOD PRESSURE: 132 MMHG | HEIGHT: 67 IN | OXYGEN SATURATION: 96 % | HEART RATE: 73 BPM | DIASTOLIC BLOOD PRESSURE: 85 MMHG | BODY MASS INDEX: 33.59 KG/M2 | WEIGHT: 214 LBS | TEMPERATURE: 98.2 F

## 2024-11-26 DIAGNOSIS — R42 LIGHTHEADEDNESS: Primary | ICD-10-CM

## 2024-11-26 LAB
ALBUMIN SERPL-MCNC: 4.4 G/DL (ref 3.4–4.8)
ALBUMIN/GLOB SERPL: 2.1 {RATIO} (ref 0.8–2)
ALP SERPL-CCNC: 78 U/L (ref 25–100)
ALT SERPL-CCNC: 27 U/L (ref 4–36)
ANION GAP SERPL CALCULATED.3IONS-SCNC: 11 MMOL/L (ref 3–16)
AST SERPL-CCNC: 22 U/L (ref 8–33)
BASOPHILS # BLD: 0 K/UL (ref 0–0.1)
BASOPHILS NFR BLD: 0.3 %
BILIRUB SERPL-MCNC: 0.4 MG/DL (ref 0.3–1.2)
BUN SERPL-MCNC: 18 MG/DL (ref 6–20)
CALCIUM SERPL-MCNC: 9.3 MG/DL (ref 8.5–10.5)
CHLORIDE SERPL-SCNC: 106 MMOL/L (ref 98–107)
CO2 SERPL-SCNC: 23 MMOL/L (ref 20–30)
CREAT SERPL-MCNC: 0.8 MG/DL (ref 0.4–1.2)
EOSINOPHIL # BLD: 0.4 K/UL (ref 0–0.4)
EOSINOPHIL NFR BLD: 5.5 %
ERYTHROCYTE [DISTWIDTH] IN BLOOD BY AUTOMATED COUNT: 11.7 % (ref 11–16)
GFR SERPLBLD CREATININE-BSD FMLA CKD-EPI: 89 ML/MIN/{1.73_M2}
GLOBULIN SER CALC-MCNC: 2.1 G/DL
GLUCOSE SERPL-MCNC: 94 MG/DL (ref 74–106)
HCT VFR BLD AUTO: 45.8 % (ref 37–47)
HGB BLD-MCNC: 15.8 G/DL (ref 11.5–16.5)
IMM GRANULOCYTES # BLD: 0 K/UL
IMM GRANULOCYTES NFR BLD: 0.3 % (ref 0–5)
LYMPHOCYTES # BLD: 2.4 K/UL (ref 1.5–4)
LYMPHOCYTES NFR BLD: 37.3 %
MCH RBC QN AUTO: 30.7 PG (ref 27–32)
MCHC RBC AUTO-ENTMCNC: 34.5 G/DL (ref 31–35)
MCV RBC AUTO: 89.1 FL (ref 80–100)
MONOCYTES # BLD: 0.4 K/UL (ref 0.2–0.8)
MONOCYTES NFR BLD: 6.7 %
NEUTROPHILS # BLD: 3.2 K/UL (ref 2–7.5)
NEUTS SEG NFR BLD: 49.9 %
PLATELET # BLD AUTO: 219 K/UL (ref 150–400)
PMV BLD AUTO: 9.8 FL (ref 6–10)
POTASSIUM SERPL-SCNC: 4.3 MMOL/L (ref 3.4–5.1)
PROT SERPL-MCNC: 6.5 G/DL (ref 6.4–8.3)
RBC # BLD AUTO: 5.14 M/UL (ref 3.8–5.8)
SODIUM SERPL-SCNC: 140 MMOL/L (ref 136–145)
WBC # BLD AUTO: 6.4 K/UL (ref 4–11)

## 2024-11-26 PROCEDURE — 80053 COMPREHEN METABOLIC PANEL: CPT

## 2024-11-26 PROCEDURE — 85025 COMPLETE CBC W/AUTO DIFF WBC: CPT

## 2024-11-26 PROCEDURE — 99285 EMERGENCY DEPT VISIT HI MDM: CPT

## 2024-11-26 PROCEDURE — 71045 X-RAY EXAM CHEST 1 VIEW: CPT

## 2024-11-26 PROCEDURE — 36415 COLL VENOUS BLD VENIPUNCTURE: CPT

## 2024-11-26 RX ORDER — M-VIT,TX,IRON,MINS/CALC/FOLIC 27MG-0.4MG
1 TABLET ORAL DAILY
COMMUNITY

## 2024-11-26 ASSESSMENT — PAIN - FUNCTIONAL ASSESSMENT
PAIN_FUNCTIONAL_ASSESSMENT: 0-10
PAIN_FUNCTIONAL_ASSESSMENT: 0-10

## 2024-11-26 ASSESSMENT — PAIN SCALES - GENERAL
PAINLEVEL_OUTOF10: 0
PAINLEVEL_OUTOF10: 0

## 2024-11-26 ASSESSMENT — LIFESTYLE VARIABLES: HOW OFTEN DO YOU HAVE A DRINK CONTAINING ALCOHOL: NEVER

## 2024-11-26 NOTE — ED PROVIDER NOTES
DISPOSITION Decision To Discharge 11/26/2024 12:40:53 PM   Stable discharge to home        PATIENT REFERRED TO:  Primary care    Schedule an appointment as soon as possible for a visit in 3 days        DISCHARGE MEDICATIONS:  New Prescriptions    No medications on file       DISCONTINUED MEDICATIONS:  Discontinued Medications    No medications on file              (Please note that portions of this note were completed with a voice recognition program.  Efforts were made to edit the dictations but occasionally words are mis-transcribed.)    Maricarmen Aguila MD (electronically signed)           Maricarmen Aguila MD  11/26/24 6495

## 2024-11-26 NOTE — ED NOTES
Reviewed discharge plan with Fidel Richardson.  Encouraged her to f/u with Hilary Bailey APRN and she understood.  NAD noted on discharge, gait steady.       Electronically signed by Juliet Garcia RN on 11/26/2024 at 1:19 PM

## 2024-11-26 NOTE — ED TRIAGE NOTES
Pt c/o dizziness that started about 1 hour ago.  Pt also states that her bp was high   153/100 and 155/101.  Upon arrival 165/88

## 2024-12-10 ENCOUNTER — E-VISIT (OUTPATIENT)
Dept: FAMILY MEDICINE CLINIC | Facility: TELEHEALTH | Age: 49
End: 2024-12-10

## 2024-12-10 DIAGNOSIS — J06.9 UPPER RESPIRATORY TRACT INFECTION, UNSPECIFIED TYPE: Primary | ICD-10-CM

## 2024-12-10 PROCEDURE — FABRICHEALTHVISIT: Performed by: NURSE PRACTITIONER

## 2024-12-10 RX ORDER — BENZONATATE 200 MG/1
200 CAPSULE ORAL 3 TIMES DAILY PRN
Start: 2024-12-10

## 2024-12-10 RX ORDER — FLUTICASONE PROPIONATE 50 MCG
2 SPRAY, SUSPENSION (ML) NASAL DAILY
Start: 2024-12-10

## 2024-12-10 NOTE — E-VISIT TREATED
Date: 12/10/2024 08:58:43  Clinician: Benita Silverio  Clinician NPI: [id number]  Patient: Dhaval Aguilera  Patient : 1975  Patient Address: [facility] [address] [place] [id number]  Patient Phone: [phone]  Visit Protocol: URI  Patient Summary:  Dhaval is a 49 year old ( : 1975 ) female who initiated a visit for cold, sinus infection, or influenza.     Dhaval states the symptoms started suddenly 3-5 days ago.   Symptom start date: 24   The symptoms consist of a   headache, myalgia, nasal congestion, a cough, facial pain or pressure, chills, a sore throat, malaise, and rhinitis. Dhaval is experiencing difficulty breathing due to nasal congestion but is not short of breath. Dhaval also feels feverish.   Symptom   details     Nasal secretions: The color of the mucus is clear.    Cough: Dhaval coughs a few times an hour and the cough is more bothersome at night. Phlegm comes into the throat when coughing. Dhaval believes the cough is caused by post-nasal drip. The color of the phlegm is clear.     Sore throat: Dhaval reports having mild throat pain (1-3 on a 10 point pain scale), does not have exudate on the tonsils, and can swallow liquids without any difficulty. The lymph nodes in the neck are not enlarged. A rash has not appeared on the skin   since the sore throat started.     Temperature: Current temperature is 98.4 degrees Fahrenheit.     Facial pain or pressure: The facial pain or pressure feels worse when bending over or leaning forward.     Headache: The headache is moderate (4-6 on a 10 point pain scale).      Dhaval denies having anosmia and ageusia, diarrhea, nausea, teeth pain, wheezing, enlarged lymph nodes, ear pain, and vomiting. Dhaval also denies having a sinus infection within the past year, double sickening (worsening symptoms after initial   improvement), taking antibiotic medication in the past month, and having recent facial or sinus surgery in the past 60 days.   Precipitating events   Within the past week, Dhaval has not been exposed to someone with strep throat. Dhaval has not recently   been exposed to someone with influenza. Dhaval has not been in close contact with any high risk individuals.    Dhaval has received the influenza vaccine more than 2 weeks ago.   Pertinent COVID-19 (Coronavirus) information  Since the symptoms started,   Dhaval has not tested for COVID-19.   Dhaval has not had COVID-19 in the last 3 months.   Dhaval has received a COVID-19 vaccine in the past year.     Pertinent medical history     A provider has not told Dhaval to avoid NSAIDs.   Dhaval does not get yeast   infections when an antibiotic is taken.   Dhaval does not have diabetes. Dhaval denies having immunosuppressive conditions (e.g., chemotherapy, HIV, organ transplant, long-term use of steroids or other immunosuppressive medications, splenectomy). Dhaval   denies having heart disease, severe COPD, and congestive heart failure. Dhaval does not have asthma.   Dhaval denies having chronic lung disease, cystic fibrosis, hypertension, long-term disabilities, mental health conditions, sickle cell disease or   thalassemia, stroke or other cardiovascular disease, substance use disorders, or tuberculosis (TB).  Dhaval smokes or uses smokeless tobacco. Dhaval does not vape or use other e-cigarette products.   Dhaval denies pregnancy and denies breastfeeding.     Additional information as reported by the patient (free text): Been taking nyqul and dayqul with very little effectiveness   Weight: 200 lbs (90.72 kg)    MEDICATIONS: venlafaxine oral, naproxen oral, ibuprofen oral, meloxicam oral, ALLERGIES: NKDA  Clinician Response:  Dear Dhaval,  Based on the information provided, you have a viral upper respiratory infection, otherwise known as a cold. Symptoms vary from person to person, but can include sneezing, coughing, a runny nose, sore throat, and headache   and range from mild to severe.  Unfortunately, there are no medications that  can cure a cold, so treatment is focused on controlling symptoms as much as possible. Most people gradually feel better until symptoms are gone in 1-2 weeks.  Medication   information  Because you have a viral infection, antibiotics will not help you get better. Treating a viral infection with antibiotics could actually make you feel worse.  For more information on why I am not prescribing antibiotics, please watch this   video: Antibiotics Aren't Always the Answer.  I am prescribing:       Fluticasone 50 mcg/actuation nasal spray. Inhale 2 sprays in each nostril 1 time per day; after 1 week, may adjust to 1 - 2 sprays in each nostril 1 time per day. This medication takes several days to start working, so keep taking it even if it doesn't   help right away. There are no refills with this prescription.      Benzonatate 200 mg oral capsule. Take 1 capsule 3 times a day as needed for cough. There are no refills with this prescription.     Unless you are allergic to the over-the-counter medication(s) below, I recommend using:     Saline nasal spray or drops(Ocean or store brand). Use 1-2 drops or sprays in each nostril as needed for congestion.   Over-the-counter medications do not require a prescription. Ask the pharmacist if you have any questions.  Tips for using a nasal   spray:     When the medication is being sprayed in your nose, point the tip of the nasal spray towards your ear.    Do not blow your nose after using the spray.    Do not lean your head back after using the spray as it will go down your throat.    Wipe the tip of the nose piece after use with a dry, clean tissue.     Self care  Steps you can take to be as comfortable as possible:     Rest.    Drink plenty of fluids.    Take a warm shower to loosen congestion.    Use a cool-mist humidifier.    Use throat lozenges.    Suck on frozen items such as popsicles.    Drink hot tea with lemon and honey.    Gargle with warm salt water (1/4 teaspoon of salt  per 8 ounce glass of water).    Take a spoonful of honey to reduce your cough.     Becoming tobacco-free is one of the best things you can do to improve your health. Smoking has been found to increase the risk of upper respiratory infections and can also lead to more severe symptoms. For help with quitting, you can call   3-179-QUIT-NOW (1-360.183.6910) or visit smokefree.gov.  When to seek care  Please be seen in a clinic or urgent care if any of the following occur:   New symptoms develop, or symptoms become worse   Call 911 or go to the emergency room if any of the   following occur:     Difficulty breathing    If you feel that your throat is closing off    Suddenly develop a rash    Unable to swallow fluids or are drooling     For the latest updates on COVID-19 (Coronavirus), please visit the Centers for Disease Control and Prevention (CDC). Also, your state and local health department websites may provide additional guidance regarding testing and isolation recommendations   for your location.   Diagnosis: Viral URI  Diagnosis ICD: J06.9    Follow up instructions: ATTENTION: If you have been prescribed medications, your prescriptions will not be sent until you choose your pharmacy.  To do so open the link within your notification, or go to [facility]ALKAR and click eVisit in the menu to open your   treatment plan. From there, you can select your pharmacy at the bottom of your after visit summary. You can also go to [email or web address]  Prescriptions  Prescription: fluticasone 50 mcg/actuation nasal spray,suspension, inhale 2 sprays in each nostril 1 time per day; after 1 week, may adjust to 1 - 2 sprays in each nostril 1 time per day.  Sent To: [facility] #[id number] - [id number] - [address] [place] [address]  Prescription: benzonatate 200 mg oral capsule, take 1 capsule 3 times a day as needed for cough  Sent To: [facility] #[id number] - [id number] - [address]  [place] [address]

## 2024-12-16 ENCOUNTER — APPOINTMENT (OUTPATIENT)
Dept: GENERAL RADIOLOGY | Facility: HOSPITAL | Age: 49
End: 2024-12-16
Attending: STUDENT IN AN ORGANIZED HEALTH CARE EDUCATION/TRAINING PROGRAM
Payer: COMMERCIAL

## 2024-12-16 ENCOUNTER — HOSPITAL ENCOUNTER (EMERGENCY)
Facility: HOSPITAL | Age: 49
Discharge: HOME | End: 2024-12-16
Attending: STUDENT IN AN ORGANIZED HEALTH CARE EDUCATION/TRAINING PROGRAM
Payer: COMMERCIAL

## 2024-12-16 VITALS
WEIGHT: 220 LBS | BODY MASS INDEX: 34.53 KG/M2 | SYSTOLIC BLOOD PRESSURE: 124 MMHG | TEMPERATURE: 98.8 F | RESPIRATION RATE: 20 BRPM | OXYGEN SATURATION: 99 % | HEART RATE: 98 BPM | HEIGHT: 67 IN | DIASTOLIC BLOOD PRESSURE: 66 MMHG

## 2024-12-16 DIAGNOSIS — J01.20 ACUTE NON-RECURRENT ETHMOIDAL SINUSITIS: Primary | ICD-10-CM

## 2024-12-16 LAB
FLUAV AG NPH QL: NEGATIVE
FLUBV AG NPH QL: NEGATIVE
S PYO AG THROAT QL: NEGATIVE
SARS-COV-2 RDRP RESP QL NAA+PROBE: NOT DETECTED

## 2024-12-16 PROCEDURE — 87804 INFLUENZA ASSAY W/OPTIC: CPT

## 2024-12-16 PROCEDURE — 87635 SARS-COV-2 COVID-19 AMP PRB: CPT

## 2024-12-16 PROCEDURE — 71046 X-RAY EXAM CHEST 2 VIEWS: CPT

## 2024-12-16 PROCEDURE — 87880 STREP A ASSAY W/OPTIC: CPT

## 2024-12-16 PROCEDURE — 99284 EMERGENCY DEPT VISIT MOD MDM: CPT

## 2024-12-16 PROCEDURE — 6370000000 HC RX 637 (ALT 250 FOR IP): Performed by: STUDENT IN AN ORGANIZED HEALTH CARE EDUCATION/TRAINING PROGRAM

## 2024-12-16 RX ORDER — BENZONATATE 100 MG/1
100 CAPSULE ORAL 3 TIMES DAILY PRN
COMMUNITY

## 2024-12-16 RX ADMIN — AMOXICILLIN AND CLAVULANATE POTASSIUM 1 TABLET: 875; 125 TABLET, FILM COATED ORAL at 13:00

## 2024-12-16 ASSESSMENT — PAIN SCALES - GENERAL: PAINLEVEL_OUTOF10: 0

## 2024-12-16 ASSESSMENT — LIFESTYLE VARIABLES
HOW OFTEN DO YOU HAVE A DRINK CONTAINING ALCOHOL: NEVER
HOW MANY STANDARD DRINKS CONTAINING ALCOHOL DO YOU HAVE ON A TYPICAL DAY: PATIENT DOES NOT DRINK

## 2024-12-16 ASSESSMENT — PAIN - FUNCTIONAL ASSESSMENT
PAIN_FUNCTIONAL_ASSESSMENT: NONE - DENIES PAIN
PAIN_FUNCTIONAL_ASSESSMENT: 0-10

## 2024-12-16 NOTE — ED PROVIDER NOTES
[facility]  EMERGENCY DEPARTMENT ENCOUNTER        Pt Name: Fidel Richardson  MRN: [id number]  Birthdate 1975  Date of evaluation: 12/16/2024  Provider: Khoa Santana DO  PCP: Hilary Bailey APRN  Note Started: 12:57 PM EST 12/16/24    CHIEF COMPLAINT       Chief Complaint   Patient presents with    Pharyngitis    Headache    Cough       HISTORY OF PRESENT ILLNESS: 1 or more Elements     History from : Patient    Limitations to history : None    Fidel Richardson is a 49 y.o. female who presents     49-year-old female present with sinus congestion for 11 days.  Is on Flonase benzoate without improvement.  Denies any fever.  Does report cold sweats.  Headache.  Productive cough.  Denies dyspnea.  Reports 1 pack/day smoking history no history of COPD        Nursing Notes were all reviewed and agreed with or any disagreements were addressed in the HPI.    REVIEW OF SYSTEMS :      Review of Systems    Review of systems reviewed and negative except as in HPI/MDM    PAST MEDICAL HISTORY     Past Medical History:   Diagnosis Date    Ganglion cyst     Mood disorder (HCC)        SURGICAL HISTORY   History reviewed. No pertinent surgical history.    CURRENTMEDICATIONS       Discharge Medication List as of 12/16/2024  1:48 PM        CONTINUE these medications which have NOT CHANGED    Details   benzonatate (TESSALON) 100 MG capsule Take 1 capsule by mouth 3 times daily as needed for CoughHistorical Med      fluticasone (VERAMYST) 27.5 MCG/SPRAY nasal spray 2 sprays by Each Nostril route dailyHistorical Med      Venlafaxine HCl (EFFEXOR XR PO) Take by mouthHistorical Med      Meloxicam (MOBIC PO) Take by mouthHistorical Med      Cetirizine HCl (ZYRTEC ALLERGY PO) Take by mouthHistorical Med      Multiple Vitamins-Minerals (THERAPEUTIC MULTIVITAMIN-MINERALS) tablet Take 1 tablet by mouth dailyHistorical Med             ALLERGIES     Patient has no known allergies.    FAMILYHISTORY

## 2025-01-09 DIAGNOSIS — M25.561 ARTHRALGIA OF RIGHT KNEE: Primary | ICD-10-CM

## 2025-01-10 ENCOUNTER — OFFICE VISIT (OUTPATIENT)
Dept: ORTHOPEDIC SURGERY | Facility: CLINIC | Age: 50
End: 2025-01-10
Payer: COMMERCIAL

## 2025-01-10 VITALS
WEIGHT: 220 LBS | HEIGHT: 67 IN | BODY MASS INDEX: 34.53 KG/M2 | DIASTOLIC BLOOD PRESSURE: 82 MMHG | SYSTOLIC BLOOD PRESSURE: 160 MMHG

## 2025-01-10 DIAGNOSIS — M25.561 CHRONIC PAIN OF RIGHT KNEE: ICD-10-CM

## 2025-01-10 DIAGNOSIS — G89.29 CHRONIC PAIN OF RIGHT KNEE: ICD-10-CM

## 2025-01-10 DIAGNOSIS — S83.241A TEAR OF MEDIAL MENISCUS OF RIGHT KNEE, CURRENT, UNSPECIFIED TEAR TYPE, INITIAL ENCOUNTER: Primary | ICD-10-CM

## 2025-01-10 RX ORDER — LIDOCAINE HYDROCHLORIDE 20 MG/ML
2 INJECTION, SOLUTION INFILTRATION; PERINEURAL
Status: COMPLETED | OUTPATIENT
Start: 2025-01-10 | End: 2025-01-10

## 2025-01-10 RX ORDER — METHYLPREDNISOLONE ACETATE 40 MG/ML
40 INJECTION, SUSPENSION INTRA-ARTICULAR; INTRALESIONAL; INTRAMUSCULAR; SOFT TISSUE
Status: COMPLETED | OUTPATIENT
Start: 2025-01-10 | End: 2025-01-10

## 2025-01-10 RX ADMIN — LIDOCAINE HYDROCHLORIDE 2 ML: 20 INJECTION, SOLUTION INFILTRATION; PERINEURAL at 12:43

## 2025-01-10 RX ADMIN — METHYLPREDNISOLONE ACETATE 40 MG: 40 INJECTION, SUSPENSION INTRA-ARTICULAR; INTRALESIONAL; INTRAMUSCULAR; SOFT TISSUE at 12:43

## 2025-01-10 NOTE — LETTER
January 10, 2025     Patient: Dhaval Aguilera   YOB: 1975   Date of Visit: 1/10/2025       To Whom It May Concern:    It is my medical opinion that Dhaval Aguilera may return to light duty immediately with the following restrictions: occasional sit down work and no lifting more than 15 lbs.           Sincerely,        Jimmy Azar PA-C

## 2025-01-10 NOTE — PROGRESS NOTES
Office Note     Name: Dhaval Aguilera    : 1975     MRN: [id number]     Chief Complaint  Injury and Pain of the Right Knee (States she was loading cabinets on to a pallet, her knee twisted and popped in October, her pain has gotten worse in the last two weeks.)    Subjective     History of Present Illness:  Dhaval Aguilera is a 49 y.o. female presenting today for evaluation of chronic right knee pain that occurred following a twisting event in 2024.  Her symptoms have waxed and waned since the injury but recently her pain has worsened in the past 2 weeks with no new injury.  She was seen in the ED in Oct 2024 for her right knee.  She was given a Toradol injection, time off work, and was given orthopedic f/u.  Today she c/o pain in the right knee medial joint line.  She is able to walk though walking makes her symptoms worse.  Resting makes her symptoms better.  She does have some improvement with her hinged knee brace.  She does have a small area in the anterior knee in the area of the patella tendon which is numb, but was numb prior to her injury.  Otherwise denies N/T in the right lower extremity.  She denies previous injury to the affected area.     Pertinent ER note reviewed, pertinent imaging reviewed.    Review of Systems     Past Medical History:   Diagnosis Date    Allergic 2018    GERD (gastroesophageal reflux disease)         Past Surgical History:   Procedure Laterality Date    SUBTOTAL HYSTERECTOMY  20    TUBAL ABDOMINAL LIGATION  97       Family History   Problem Relation Age of Onset    Alcohol abuse Mother     Asthma Mother     Liver disease Mother     Alcohol abuse Maternal Grandfather     COPD Maternal Grandmother     Alcohol abuse Paternal Grandfather     Stroke Paternal Grandfather     Alcohol abuse Brother     Alcohol abuse Maternal Aunt     Drug abuse Son     Drug abuse Son        Social History     Socioeconomic History    Marital status:    Tobacco Use  "   Smoking status: Every Day     Current packs/day: 1.00     Average packs/day: 1 pack/day for 41.7 years (41.7 ttl pk-yrs)     Types: Cigarettes     Start date: 4/14/1983    Smokeless tobacco: Never    Tobacco comments:     Tried quitting several times unsuccessfully   Vaping Use    Vaping status: Never Used   Substance and Sexual Activity    Alcohol use: Not Currently    Drug use: Never    Sexual activity: Not Currently     Partners: Male     Birth control/protection: Hysterectomy         Current Outpatient Medications:     Black Cohosh 540 MG capsule, , Disp: , Rfl:     Cetirizine HCl (ZYRTEC PO), Take  by mouth., Disp: , Rfl:     meloxicam (MOBIC) 15 MG tablet, Take 1 tablet by mouth Daily., Disp: 30 tablet, Rfl: 0    multivitamin (Multi-Vitamin Daily) tablet tablet, , Disp: , Rfl:     venlafaxine XR (EFFEXOR-XR) 37.5 MG 24 hr capsule, Take 1 capsule by mouth Daily., Disp: 90 capsule, Rfl: 1    No Known Allergies        Objective   /82   Ht 170.2 cm (67.01\")   Wt 99.8 kg (220 lb)   BMI 34.45 kg/m²    BMI is >= 30 and <35. (Class 1 Obesity). The following options were offered after discussion;: weight loss educational material (shared in after visit summary)       Physical Exam  Musculoskeletal:      Right knee: No effusion.      Instability Tests: Medial Kathleen test positive. Lateral Kathleen test negative.     Right Knee Exam     Tenderness   The patient is experiencing tenderness in the medial joint line and MCL.    Range of Motion   The patient has normal right knee ROM.    Tests   Kathleen:  Medial - positive Lateral - negative  Varus: negative Valgus: negative  Lachman:  Anterior - negative    Posterior - negative  Drawer:  Anterior - negative    Posterior - negative    Other   Erythema: absent  Sensation: normal  Pulse: present  Swelling: none  Effusion: no effusion present    Comments:  Medial Kathleen positive for pain without click, no mechanical locking noted.         Extremity DVT signs are " negative by clinical screen.     Independent Review of Radiographic Studies:    2 view plain films of the right knee were done in office today for the evaluation of pain and joint condition, comparison views available.  No acute osseous abnormalities are seen.  Mild degenerative changes are seen including mild joint space narrowing of the medial and patellofemoral compartments with a mild steel fight on the superior pole of the patella.  There is also a tiny calcification seen in the lateral joint space.  This calcification is not seen on her previous knee exam in October 2024.    Large Joint Arthrocentesis: R knee  Date/Time: 1/10/2025 12:43 PM  Consent given by: patient  Site marked: site marked  Timeout: Immediately prior to procedure a time out was called to verify the correct patient, procedure, equipment, support staff and site/side marked as required   Supporting Documentation  Indications: pain   Procedure Details  Location: knee - R knee  Preparation: Patient was prepped and draped in the usual sterile fashion  Needle size: 22 G  Medications administered: 40 mg methylPREDNISolone acetate 40 MG/ML; 2 mL lidocaine 2%  Patient tolerance: patient tolerated the procedure well with no immediate complications      Assessment and Plan   Diagnoses and all orders for this visit:    1. Tear of medial meniscus of right knee, current, unspecified tear type, initial encounter (Primary)  -     MRI Knee Right Without Contrast; Future  -     Ambulatory Referral to Physical Therapy for Evaluation & Treatment    2. Chronic pain of right knee  -     MRI Knee Right Without Contrast; Future  -     Ambulatory Referral to Physical Therapy for Evaluation & Treatment       Discussion of orthopedic goals  Orthopedic activities reviewed and patient expressed appreciation  Regular exercise as tolerated  Risk, benefits, and merits of treatment alternatives reviewed with the patient and questions answered  Patient guided on mobility and  conditioning exercises  Ice, heat, and/or modalities as beneficial  Call or notify for any adverse effect from injection therapy  Physical therapy referral given  Reduced physical activity as appropriate and avoid offending activity  Weight bearing parameters reviewed  Use brace as instructed  Assistive device encouraged for safety and support  Counseling on diet, nutrition, fitness exercise, and weight reduction goals    Recommendations/Plan:  Exercise, medications, injections, other patient advice, and return appointment as noted.  Referral: Physical and Occupational Therapy referral.  Test/Studies: MRI right knee without contrast  Patient is encouraged to call or return for any issues or concerns.    Patient is displaying signs and symptoms of a medial meniscus tear that most likely occurred from her twisting event in October.  She was given an intra-articular cortisone injection for symptomatic relief and a referral for physical therapy for rehabilitation.  An MRI of the right knee without contrast was ordered for further evaluation of the soft tissue.  I advised follow-up with me in 6 weeks for reevaluation.    Return in about 6 weeks (around 2/21/2025) for Recheck.  Patient agreeable to call or return sooner for any concerns.

## 2025-01-14 ENCOUNTER — TELEPHONE (OUTPATIENT)
Dept: INTERNAL MEDICINE | Facility: CLINIC | Age: 50
End: 2025-01-14
Payer: COMMERCIAL

## 2025-01-14 NOTE — TELEPHONE ENCOUNTER
Forward this to ortho I am not sure if this is a lingering effect. I know some patients can be nauseous after but not sure that it lasts. Meloxicam can cause nausea if she is taking that especially if on an empty stomach.

## 2025-01-14 NOTE — TELEPHONE ENCOUNTER
Caller: Dhaval Aguilera    Relationship: Self    Best call back number: [phone]     Which medication are you concerned about: MEDICATIONS    What are your concerns: PATIENT STATES AFTER TAKING THE CORTISONE SHOT, WHEN SHE TAKES MAINTENANCE MEDICATIONS, ABOUT 30 MINUTES AFTER SHE BEGINS TO FEEL NAUSEAS. PATIENT STATES SHE EXPERIENCES THIS WETHER SHE EATS OR NOT. PLEASE CALL PATIENT TO DISCUSS.

## 2025-01-14 NOTE — TELEPHONE ENCOUNTER
Patient had a depo medrol shot from ortho on Friday, since the injection she has been getting nauseas after taking her OTC medication and venlafaxine. Patient is wanting to know if you think this will subside or if she should medication?

## 2025-01-29 DIAGNOSIS — R23.2 HOT FLASHES: ICD-10-CM

## 2025-01-29 DIAGNOSIS — F41.9 ANXIETY: ICD-10-CM

## 2025-01-30 DIAGNOSIS — F41.9 ANXIETY: ICD-10-CM

## 2025-01-30 DIAGNOSIS — R23.2 HOT FLASHES: ICD-10-CM

## 2025-01-30 RX ORDER — VENLAFAXINE HYDROCHLORIDE 37.5 MG/1
37.5 CAPSULE, EXTENDED RELEASE ORAL DAILY
Qty: 90 CAPSULE | Refills: 1 | Status: SHIPPED | OUTPATIENT
Start: 2025-01-30

## 2025-01-30 RX ORDER — VENLAFAXINE HYDROCHLORIDE 37.5 MG/1
37.5 CAPSULE, EXTENDED RELEASE ORAL DAILY
Qty: 90 CAPSULE | Refills: 1 | OUTPATIENT
Start: 2025-01-30

## 2025-02-12 ENCOUNTER — HOSPITAL ENCOUNTER (OUTPATIENT)
Dept: MRI IMAGING | Facility: HOSPITAL | Age: 50
Discharge: HOME | End: 2025-02-12
Admitting: STUDENT IN AN ORGANIZED HEALTH CARE EDUCATION/TRAINING PROGRAM
Payer: MEDICAID

## 2025-02-12 DIAGNOSIS — G89.29 CHRONIC PAIN OF RIGHT KNEE: ICD-10-CM

## 2025-02-12 DIAGNOSIS — M25.561 CHRONIC PAIN OF RIGHT KNEE: ICD-10-CM

## 2025-02-12 DIAGNOSIS — S83.241A TEAR OF MEDIAL MENISCUS OF RIGHT KNEE, CURRENT, UNSPECIFIED TEAR TYPE, INITIAL ENCOUNTER: ICD-10-CM

## 2025-02-12 PROCEDURE — 73721 MRI JNT OF LWR EXTRE W/O DYE: CPT

## 2025-02-26 ENCOUNTER — OFFICE VISIT (OUTPATIENT)
Dept: ORTHOPEDIC SURGERY | Facility: CLINIC | Age: 50
End: 2025-02-26
Payer: MEDICAID

## 2025-02-26 VITALS
HEIGHT: 67 IN | BODY MASS INDEX: 33.59 KG/M2 | DIASTOLIC BLOOD PRESSURE: 80 MMHG | WEIGHT: 214 LBS | SYSTOLIC BLOOD PRESSURE: 122 MMHG

## 2025-02-26 DIAGNOSIS — M25.561 CHRONIC PAIN OF RIGHT KNEE: ICD-10-CM

## 2025-02-26 DIAGNOSIS — G89.29 CHRONIC PAIN OF RIGHT KNEE: ICD-10-CM

## 2025-02-26 DIAGNOSIS — S83.241A TEAR OF MEDIAL MENISCUS OF RIGHT KNEE, CURRENT, UNSPECIFIED TEAR TYPE, INITIAL ENCOUNTER: Primary | ICD-10-CM

## 2025-02-26 DIAGNOSIS — M25.561 ARTHRALGIA OF RIGHT KNEE: ICD-10-CM

## 2025-02-26 NOTE — PROGRESS NOTES
Office Note     Name: Dhaval Aguilera    : 1975     MRN: [id number]     Chief Complaint  Follow-up of the Right Knee (She states knee is doing better, pain has decreased. )    Subjective     History of Present Illness:  Dhaval Aguilera is a 49 y.o. female presenting today for right knee follow-up.  Patient states that her symptoms have improved significantly since her last visit with me.  She does continue to have mild, occasional pains with certain movements.  Denies significant pain at rest.  States that she is supposed to start a new job at [employer] soon.  Denies any new or worsening symptoms since her previous visit with me.  She is also here to discuss recent MRI results.  Reports good compliance with PT and activity modifications.        Review of Systems     Past Medical History:   Diagnosis Date    Allergic 2018    GERD (gastroesophageal reflux disease)         Past Surgical History:   Procedure Laterality Date    SUBTOTAL HYSTERECTOMY  20    TUBAL ABDOMINAL LIGATION  97       Family History   Problem Relation Age of Onset    Alcohol abuse Mother     Asthma Mother     Liver disease Mother     Alcohol abuse Maternal Grandfather     COPD Maternal Grandmother     Alcohol abuse Paternal Grandfather     Stroke Paternal Grandfather     Alcohol abuse Brother     Alcohol abuse Maternal Aunt     Drug abuse Son     Drug abuse Son        Social History     Socioeconomic History    Marital status:    Tobacco Use    Smoking status: Every Day     Current packs/day: 1.00     Average packs/day: 1 pack/day for 41.9 years (41.9 ttl pk-yrs)     Types: Cigarettes     Start date: 1983    Smokeless tobacco: Never    Tobacco comments:     Tried quitting several times unsuccessfully   Vaping Use    Vaping status: Never Used   Substance and Sexual Activity    Alcohol use: Not Currently    Drug use: Never    Sexual activity: Not Currently     Partners: Male     Birth control/protection: Hysterectomy  "        Current Outpatient Medications:     Black Cohosh 540 MG capsule, , Disp: , Rfl:     Cetirizine HCl (ZYRTEC PO), Take  by mouth., Disp: , Rfl:     meloxicam (MOBIC) 15 MG tablet, Take 1 tablet by mouth Daily., Disp: 30 tablet, Rfl: 0    multivitamin (Multi-Vitamin Daily) tablet tablet, , Disp: , Rfl:     venlafaxine XR (EFFEXOR-XR) 37.5 MG 24 hr capsule, Take 1 capsule by mouth Daily., Disp: 90 capsule, Rfl: 1    No Known Allergies        Objective   /80   Ht 170.2 cm (67.01\")   Wt 97.1 kg (214 lb)   BMI 33.51 kg/m²            Physical Exam  Musculoskeletal:      Right knee: No effusion.      Instability Tests: Medial Kathleen test positive.       Right Knee Exam     Muscle Strength   The patient has normal right knee strength.    Tenderness   The patient is experiencing tenderness in the medial joint line.    Range of Motion   The patient has normal right knee ROM.    Tests   Kathleen:  Medial - positive   Varus: negative Valgus: negative  Lachman:  Anterior - negative    Posterior - negative    Other   Erythema: absent  Sensation: normal  Pulse: present  Swelling: none  Effusion: no effusion present           Extremity DVT signs are negative by clinical screen.     Independent Review of Radiographic Studies:    MRI Knee Right Without Contrast  Order date: 2/12/2025  Authorizing: Jimmy Azar PA-C  Ordered by Jimmy Azar PA-C on 2/12/2025.     Narrative & Impression    PROCEDURE: MRI KNEE RIGHT  WO CONTRAST-        HISTORY: Chronic right medial knee pain ongoing for more than 4 months,  suspect medial meniscus tear; S83.241A-Other tear of medial meniscus,  current injury, right knee, initial encounter; M25.561-Pain in right  knee; G89.29-Other chronic pain     TECHNIQUE: Multiplanar MR without gadolinium enhancement.     FINDINGS:     ARTICULAR CARTILAGE: Significant thinning of the articular cartilage  within the medial compartment. Grade III chondromalacia patella.     MARROW SIGNAL: Small " areas of subchondral marrow edema of the medial  patella related to cartilaginous disease.     JOINT FLUID: Small joint effusion without loose body.     MENISCI: Small complex tear posterior horn medial meniscus. Lateral  meniscus intact.     LIGAMENT: Intact.     Patellar and quadriceps tendon are normal.     IMPRESSION:  1. Moderate degenerative changes of the medial compartment and  patellofemoral joint.  2. Small tear posterior horn medial meniscus without displaced meniscal  fragment.           This report was signed and finalized on 2/13/2025 9:37 AM by Puneet Roberto MD.       Procedures    Assessment and Plan   Diagnoses and all orders for this visit:    1. Tear of medial meniscus of right knee, current, unspecified tear type, initial encounter (Primary)    2. Arthralgia of right knee    3. Chronic pain of right knee       Discussion of orthopedic goals  Orthopedic activities reviewed and patient expressed appreciation  Risk, benefits, and merits of treatment alternatives reviewed with the patient and questions answered  Patient guided on mobility and conditioning exercises  Ice, heat, and/or modalities as needed and beneficial  Reduced physical activity as appropriate and avoid aggravating activities.   Weight bearing parameters reviewed.     Recommendations/Plan:  Exercise, medications, injections, other patient advice, and return appointment as noted.  Patient and/or guardian(s) were encouraged to call or return to the office for any issues or concerns.    Patient appears to have made a very good functional recovery concerning her right knee.  She does continue to have mild residual symptoms which I suspect will improve in the coming weeks and months.  Encouraged her to continue with her PT and home exercise plan as scheduled.  She denies the need for further work restriction.  States that she no longer works at [email or web address].  I advised follow-up with me as needed concerning her right knee.    Return if  symptoms worsen or fail to improve.

## 2025-04-22 ENCOUNTER — OFFICE VISIT (OUTPATIENT)
Dept: INTERNAL MEDICINE | Facility: CLINIC | Age: 50
End: 2025-04-22
Payer: MEDICAID

## 2025-04-22 VITALS
BODY MASS INDEX: 33.43 KG/M2 | DIASTOLIC BLOOD PRESSURE: 82 MMHG | TEMPERATURE: 97.3 F | SYSTOLIC BLOOD PRESSURE: 132 MMHG | OXYGEN SATURATION: 98 % | WEIGHT: 213 LBS | HEIGHT: 67 IN | HEART RATE: 88 BPM

## 2025-04-22 DIAGNOSIS — R20.2 TINGLING OF BOTH FEET: ICD-10-CM

## 2025-04-22 DIAGNOSIS — Z13.29 SCREENING FOR ENDOCRINE, METABOLIC AND IMMUNITY DISORDER: ICD-10-CM

## 2025-04-22 DIAGNOSIS — Z13.0 SCREENING FOR DEFICIENCY ANEMIA: ICD-10-CM

## 2025-04-22 DIAGNOSIS — Z13.0 SCREENING FOR ENDOCRINE, METABOLIC AND IMMUNITY DISORDER: ICD-10-CM

## 2025-04-22 DIAGNOSIS — M54.50 ACUTE BILATERAL LOW BACK PAIN WITHOUT SCIATICA: Primary | ICD-10-CM

## 2025-04-22 DIAGNOSIS — Z13.228 SCREENING FOR ENDOCRINE, METABOLIC AND IMMUNITY DISORDER: ICD-10-CM

## 2025-04-22 DIAGNOSIS — M25.561 ARTHRALGIA OF RIGHT KNEE: ICD-10-CM

## 2025-04-22 DIAGNOSIS — M25.521 RIGHT ELBOW PAIN: ICD-10-CM

## 2025-04-22 DIAGNOSIS — Z13.220 SCREENING FOR CHOLESTEROL LEVEL: ICD-10-CM

## 2025-04-22 DIAGNOSIS — Z00.00 ANNUAL PHYSICAL EXAM: Primary | ICD-10-CM

## 2025-04-22 DIAGNOSIS — S83.242S TEAR OF MEDIAL MENISCUS OF LEFT KNEE, CURRENT, UNSPECIFIED TEAR TYPE, SEQUELA: ICD-10-CM

## 2025-04-22 DIAGNOSIS — M25.511 CHRONIC RIGHT SHOULDER PAIN: ICD-10-CM

## 2025-04-22 DIAGNOSIS — G89.29 CHRONIC RIGHT SHOULDER PAIN: ICD-10-CM

## 2025-04-22 DIAGNOSIS — M67.88 ACHILLES TENDONOSIS OF LEFT LOWER EXTREMITY: ICD-10-CM

## 2025-04-22 RX ORDER — OMEPRAZOLE 20 MG/1
20 CAPSULE, DELAYED RELEASE ORAL DAILY
COMMUNITY

## 2025-04-22 RX ORDER — CELECOXIB 200 MG/1
CAPSULE ORAL
Qty: 30 CAPSULE | Refills: 2 | Status: SHIPPED | OUTPATIENT
Start: 2025-04-22

## 2025-04-22 NOTE — LETTER
April 22, 2025     Patient: Dhaval Aguilera   YOB: 1975   Date of Visit: 4/22/2025       To Whom It May Concern:    Please excuse Dhaval Aguilera for the absences 4/15, 4/17, and 4/19 due to exacerbation of left achilles tendonitis and right knee meniscus injury. If you have any questions or concerns please contact our clinic. She will follow up and orthopedics as needed.        Sincerely,      POPPY Gonsalez

## 2025-05-02 ENCOUNTER — OFFICE VISIT (OUTPATIENT)
Dept: INTERNAL MEDICINE | Facility: CLINIC | Age: 50
End: 2025-05-02
Payer: COMMERCIAL

## 2025-05-02 VITALS
WEIGHT: 220 LBS | HEART RATE: 91 BPM | DIASTOLIC BLOOD PRESSURE: 76 MMHG | HEIGHT: 67 IN | SYSTOLIC BLOOD PRESSURE: 126 MMHG | BODY MASS INDEX: 34.53 KG/M2 | OXYGEN SATURATION: 95 % | TEMPERATURE: 97 F

## 2025-05-02 DIAGNOSIS — F17.210 SMOKING GREATER THAN 20 PACK YEARS: ICD-10-CM

## 2025-05-02 DIAGNOSIS — R91.8 LUNG NODULES: ICD-10-CM

## 2025-05-02 DIAGNOSIS — E66.811 OBESITY, CLASS I, BMI 30-34.9: ICD-10-CM

## 2025-05-02 DIAGNOSIS — M54.9 CHRONIC NECK AND BACK PAIN: ICD-10-CM

## 2025-05-02 DIAGNOSIS — J30.2 SEASONAL ALLERGIES: ICD-10-CM

## 2025-05-02 DIAGNOSIS — K21.9 GASTROESOPHAGEAL REFLUX DISEASE WITHOUT ESOPHAGITIS: ICD-10-CM

## 2025-05-02 DIAGNOSIS — G89.29 CHRONIC NECK AND BACK PAIN: ICD-10-CM

## 2025-05-02 DIAGNOSIS — Z12.31 ENCOUNTER FOR SCREENING MAMMOGRAM FOR MALIGNANT NEOPLASM OF BREAST: ICD-10-CM

## 2025-05-02 DIAGNOSIS — M54.2 CHRONIC NECK AND BACK PAIN: ICD-10-CM

## 2025-05-02 DIAGNOSIS — R23.2 HOT FLASHES: ICD-10-CM

## 2025-05-02 DIAGNOSIS — Z00.00 ANNUAL PHYSICAL EXAM: Primary | ICD-10-CM

## 2025-05-02 DIAGNOSIS — M25.50 POLYARTHRALGIA: ICD-10-CM

## 2025-05-02 DIAGNOSIS — R20.2 TINGLING OF BOTH FEET: ICD-10-CM

## 2025-05-02 DIAGNOSIS — F41.9 ANXIETY: ICD-10-CM

## 2025-05-02 DIAGNOSIS — R19.5 POSITIVE COLORECTAL CANCER SCREENING USING COLOGUARD TEST: ICD-10-CM

## 2025-05-04 NOTE — PROGRESS NOTES
"      Female Physical Note      Date: 2025   Patient Name: Dhaval Aguilera  : 1975   MRN: [id number]     Chief Complaint:    Chief Complaint   Patient presents with    Annual Exam       History of Present Illness: Dhaval Aguilera is a 50 y.o. female who is here today for annual health maintenance and physical.  Polyarthralgia- tried naproxen, meloxicam, celebrex without relief. Sees orthopedics currently for right knee. Tylenol arthritis helps but causes drowsiness. Pain in neck and back, all joints. She has been unable to work due to her symptoms at [place]. She has applied for disability. Labs are pending;  BRITTANY, RF, CRP, Sed rate, Uric acid. Pain today 6/10.     Previous note 25: \"She saw PENELOPE Huber for her right knee and right shoulder. MRI of shoulder showed partial thickness articular surface tear of the supraspinatus tendon  and did physical therapy. She still has chronic pain of the shoulder at times. MRI of right knee revealed a tear in the medial meniscus and moderate joint narrowing. Physical therapy was initiated, which improved her mobility, but she continues to experience difficulty ascending stairs. She resumed part-time work approximately a month ago, working 4 to 6 hours a day, a few days a week. However, a recent increase in her workload, with 3 consecutive days of work, including a 9-hour and a 7-hour shift, exacerbated her symptoms, causing her to miss due to pain. She reports no erythema, warmth or swelling in her joints, except for occasional swelling in her right knee. She has been advised against knee and shoulder surgery in the past. She saw a podiatrist with [facility] and they diagnosed her with achilles tendinosis of left foot. She did stretches for this which helped but it exacerbates when she is on her feet for extended periods of time. She experiences tingling in the balls of her feet, numbness in last 3 toes and pain in 2nd toe. She also reports " "right elbow pain and lower back pain, all of which have intensify since her recent work shifts.\"    GERD stable on omeprazole prn  Anxiety/hot flashes stable on effexor   Allergies stable on zyrtec   Vaccines and screenings reviewed. Hx hysterectomy. Due mammogram and lung cancer screening, hx of stable nodules. Continues smoking 42 pack year hx.     Subjective      Review of Systems:   Pertinent ROS in HPI    Past Medical History, Social History, Family History and Care Team were all reviewed with patient and updated as appropriate.     Medications:     Current Outpatient Medications:     celecoxib (CeleBREX) 200 MG capsule, Take 1 capsule PO BID x 3 days, then 1 capsule daily with food, Disp: 30 capsule, Rfl: 2    Cetirizine HCl (ZYRTEC PO), Take  by mouth., Disp: , Rfl:     omeprazole (priLOSEC) 20 MG capsule, Take 1 capsule by mouth Daily., Disp: , Rfl:     venlafaxine XR (EFFEXOR-XR) 37.5 MG 24 hr capsule, Take 1 capsule by mouth Daily., Disp: 90 capsule, Rfl: 1    Allergies:   Allergies   Allergen Reactions    Black Cohosh Nausea Only     Immunizations:  Immunization History   Administered Date(s) Administered    COVID-19 (PFIZER) BIVALENT 12+YRS 05/12/2023    Covid-19 (Pfizer) Gray Cap Monovalent 04/06/2021, 05/04/2021, 12/14/2021    Fluzone  >6mos 11/14/2024    Pneumococcal Conjugate 20-Valent (PCV20) 08/28/2023    Shingrix 10/03/2023    Tdap 08/28/2023       Colorectal Screening:   Last Completed Colonoscopy            Upcoming       COLORECTAL CANCER SCREENING (COLOGUARD - Every 3 Years) Next due on 9/1/2026 09/01/2023  Cologuard component of Cologuard - Stool, Per Rectum                          Tobacco Use: High Risk (5/4/2025)    Patient History     Smoking Tobacco Use: Every Day     Smokeless Tobacco Use: Never     Passive Exposure: Not on file       Social History     Substance and Sexual Activity   Alcohol Use Not Currently        Social History     Substance and Sexual Activity   Drug Use " "Never        PHQ-2 Depression Screening  Little interest or pleasure in doing things? Not at all   Feeling down, depressed, or hopeless? Not at all   PHQ-2 Total Score 0       The 10-year ASCVD risk score (Jessee PURVIS, et al., 2019) is: 4%    Values used to calculate the score:      Age: 50 years      Sex: Female      Is Non- : No      Diabetic: No      Tobacco smoker: Yes      Systolic Blood Pressure: 126 mmHg      Is BP treated: No      HDL Cholesterol: 59 mg/dL      Total Cholesterol: 219 mg/dL    Objective     Vital Signs:   Vitals:    05/02/25 1429   BP: 126/76   Pulse: 91   Temp: 97 °F (36.1 °C)   SpO2: 95%   Weight: 99.8 kg (220 lb)   Height: 170.2 cm (67.01\")   PainSc: 6        Physical Exam  Vitals and nursing note reviewed.   Constitutional:       Appearance: Normal appearance. She is obese.   HENT:      Head: Normocephalic and atraumatic.      Right Ear: Tympanic membrane, ear canal and external ear normal.      Left Ear: Tympanic membrane, ear canal and external ear normal.      Nose: Nose normal.      Mouth/Throat:      Mouth: Mucous membranes are moist.      Pharynx: Oropharynx is clear.   Eyes:      General: No scleral icterus.     Extraocular Movements: Extraocular movements intact.      Conjunctiva/sclera: Conjunctivae normal.      Pupils: Pupils are equal, round, and reactive to light.   Neck:      Thyroid: No thyromegaly.   Cardiovascular:      Rate and Rhythm: Normal rate and regular rhythm.      Pulses:           Dorsalis pedis pulses are 2+ on the right side and 2+ on the left side.        Posterior tibial pulses are 2+ on the right side and 2+ on the left side.      Heart sounds: Normal heart sounds.   Pulmonary:      Effort: Pulmonary effort is normal.      Breath sounds: Normal breath sounds.   Abdominal:      General: Bowel sounds are normal. There is no distension.      Palpations: Abdomen is soft.      Tenderness: There is no abdominal tenderness. There is no " guarding.   Musculoskeletal:         General: Swelling (right knee) and tenderness present. Normal range of motion.      Cervical back: Normal range of motion and neck supple.      Right lower leg: No edema.   Lymphadenopathy:      Cervical: No cervical adenopathy.   Skin:     General: Skin is warm and dry.      Findings: No rash.   Neurological:      General: No focal deficit present.      Mental Status: She is alert and oriented to person, place, and time. Mental status is at baseline.      Cranial Nerves: No cranial nerve deficit.      Sensory: No sensory deficit.      Motor: No weakness.      Coordination: Coordination normal.      Gait: Gait normal.   Psychiatric:         Mood and Affect: Mood normal.         Behavior: Behavior normal.         Thought Content: Thought content normal.       Assessment / Plan      Assessment/Plan:   Diagnoses and all orders for this visit:    1. Annual physical exam (Primary)    2. Encounter for screening mammogram for malignant neoplasm of breast  -     Mammo Screening Digital Tomosynthesis Bilateral With CAD    3. Smoking greater than 20 pack years  -     CT Chest Low Dose Wo; Future    4. Lung nodules  -     CT Chest Low Dose Wo; Future    5. Positive colorectal cancer screening using Cologuard test    6. Polyarthralgia    7. Chronic neck and back pain  -     Ambulatory Referral to Orthopedic Surgery    8. Tingling of both feet  -     Ambulatory Referral to Orthopedic Surgery    9. Anxiety    10. Hot flashes    11. Gastroesophageal reflux disease without esophagitis    12. Seasonal allergies    13. Obesity, Class I, BMI 30-34.9       Assessment & Plan  Continue Celebrex w/ tylenol arthritis prn for polyarthralgia, heat, ice, otc pain relief creams, maintain physical activity   She has applied for disability due to inability to work  Lab workup pending; BRITTANY, RF, CRP, Sed rate, uric acid  Referral to ortho spine, Dr Elizabeth, for evaluation for chronic neck/back pain   Continue  seeing [facility] for other joint pains prn  Continue Effexor for mood/hot flashes  Continue omeprazole prn for GERD as well as reflux measures/diet  Continue Zyrtec for seasonal allergies    Healthcare Maintenance:  Counseling provided based on age appropriate USPSTF guidelines and vaccinations   Encouraged 150 minutes of exercise total per week for heart health   Recommend balanced healthy diet   Dental visits twice per year, yearly eye exams, yearly skin assessments with dermatology   Pap not indicated, hysterectomy, self breast exam once per month, mammogram annually   Colonoscopy due now; number provided to schedule, had positive cologuard   Lung cancer screening annually   BMI is >= 30 and <35. (Class 1 Obesity). The following options were offered after discussion;: exercise counseling/recommendations and nutrition counseling/recommendations     Dhaval Aguilera voices understanding and acceptance of this advice and will call back with any further questions or concerns. AVS with preventive healthcare tips printed for patient.     Follow Up:   Return in about 1 year (around 5/2/2026) for Annual Physical.      POPPY Bermeo  [facility]

## 2025-05-05 LAB
ALBUMIN SERPL-MCNC: 4.1 G/DL (ref 3.8–4.9)
ALP SERPL-CCNC: 81 IU/L (ref 44–121)
ALT SERPL-CCNC: 23 IU/L (ref 0–32)
ANA SER QL: POSITIVE
AST SERPL-CCNC: 20 IU/L (ref 0–40)
BILIRUB SERPL-MCNC: 0.4 MG/DL (ref 0–1.2)
BUN SERPL-MCNC: 11 MG/DL (ref 6–24)
BUN/CREAT SERPL: 12 (ref 9–23)
CALCIUM SERPL-MCNC: 8.9 MG/DL (ref 8.7–10.2)
CENTROMERE B AB SER-ACNC: <0.2 AI (ref 0–0.9)
CHLORIDE SERPL-SCNC: 103 MMOL/L (ref 96–106)
CHOLEST SERPL-MCNC: 219 MG/DL (ref 100–199)
CHROMATIN AB SERPL-ACNC: <0.2 AI (ref 0–0.9)
CO2 SERPL-SCNC: 23 MMOL/L (ref 20–29)
CREAT SERPL-MCNC: 0.89 MG/DL (ref 0.57–1)
CRP SERPL-MCNC: 2 MG/L (ref 0–10)
DSDNA AB SER-ACNC: 17 IU/ML (ref 0–9)
EGFRCR SERPLBLD CKD-EPI 2021: 78 ML/MIN/1.73
ENA JO1 AB SER-ACNC: <0.2 AI (ref 0–0.9)
ENA RNP AB SER-ACNC: 0.2 AI (ref 0–0.9)
ENA SCL70 AB SER-ACNC: <0.2 AI (ref 0–0.9)
ENA SM AB SER-ACNC: <0.2 AI (ref 0–0.9)
ENA SS-A AB SER-ACNC: <0.2 AI (ref 0–0.9)
ENA SS-B AB SER-ACNC: <0.2 AI (ref 0–0.9)
ERYTHROCYTE [DISTWIDTH] IN BLOOD BY AUTOMATED COUNT: 12 % (ref 11.7–15.4)
ERYTHROCYTE [SEDIMENTATION RATE] IN BLOOD BY WESTERGREN METHOD: 6 MM/HR (ref 0–40)
GLOBULIN SER CALC-MCNC: 1.9 G/DL (ref 1.5–4.5)
GLUCOSE SERPL-MCNC: 96 MG/DL (ref 70–99)
HCT VFR BLD AUTO: 46.6 % (ref 34–46.6)
HDLC SERPL-MCNC: 59 MG/DL
HGB BLD-MCNC: 15.5 G/DL (ref 11.1–15.9)
LDLC SERPL CALC-MCNC: 136 MG/DL (ref 0–99)
Lab: ABNORMAL
MCH RBC QN AUTO: 29.8 PG (ref 26.6–33)
MCHC RBC AUTO-ENTMCNC: 33.3 G/DL (ref 31.5–35.7)
MCV RBC AUTO: 89 FL (ref 79–97)
PLATELET # BLD AUTO: 213 X10E3/UL (ref 150–450)
POTASSIUM SERPL-SCNC: 4.2 MMOL/L (ref 3.5–5.2)
PROT SERPL-MCNC: 6 G/DL (ref 6–8.5)
RBC # BLD AUTO: 5.21 X10E6/UL (ref 3.77–5.28)
RHEUMATOID FACT SERPL-ACNC: 11.1 IU/ML
SODIUM SERPL-SCNC: 140 MMOL/L (ref 134–144)
TRIGL SERPL-MCNC: 133 MG/DL (ref 0–149)
TSH SERPL DL<=0.005 MIU/L-ACNC: 1.24 UIU/ML (ref 0.45–4.5)
URATE SERPL-MCNC: 4.2 MG/DL (ref 3–7.2)
VLDLC SERPL CALC-MCNC: 24 MG/DL (ref 5–40)
WBC # BLD AUTO: 6.9 X10E3/UL (ref 3.4–10.8)

## 2025-06-17 ENCOUNTER — TELEPHONE (OUTPATIENT)
Dept: INTERNAL MEDICINE | Facility: CLINIC | Age: 50
End: 2025-06-17

## 2025-06-17 NOTE — TELEPHONE ENCOUNTER
I'm not sure what type of letter she needs? Can you find out and let me know I've not had to write a letter like this before.

## 2025-06-17 NOTE — TELEPHONE ENCOUNTER
"Attempted contacting Pt, no answer.     Relay     \"What type of letter is she needing exactly, what are they needing it to say? We've never done a letter like this before. \"                  "

## 2025-06-17 NOTE — LETTER
June 19, 2025     Dhaval Aguilera    Patient: Dhaval Aguilera   YOB: 1975   Date of Visit: 6/17/2025     To Whom It May Concern:      I am writing on behalf of my patient, Dhaval Aguilera, who is currently under my care for multiple chronic medical conditions that significantly impact her ability to work at this time.    She has a history of polyarthralgia, cervical and lumbar spine pain, osteoarthritis affecting multiple joints including right shoulder and right knee.  She went underwent an MRI of the right shoulder in 2023 which revealed a partial-thickness articular surface tear of the supraspinatus tendon.  Despite completing a course of physical therapy she continues to experience persistent shoulder pain and limited range of motion.    Additionally an MRI of her right knee showed a medial meniscus tear and moderate joint space narrowing, also treated with physical therapy.  However she continues to report pain and occasional swelling especially with varying block standing or walking.  She continues to see orthopedics as needed for these issues.    She is also affected by Achilles tendinosis of the left foot for which she was evaluated by podiatrist and engaged in stretching and strengthening exercises.  Despite treatment her symptoms recur with prolonged weightbearing.  She experiences tingling in the balls of her feet, numbness in the last 3 toes of the left foot and sharp pain in the second toe which further limits her mobility.    Labs were conducted and patient had a positive BRITTANY and has been referred to a rheumatologist with an appointment on August 25, 2025, to establish care and further evaluation of autoimmune/inflammatory  and osteoarthritis conditions.    In addition to the musculoskeletal issues she has been evaluated for lung nodules, GERD, obesity, hot flashes, seasonal allergies and anxiety.    Due to the cumulative impact of these conditions, particularly the ongoing pain, limited  mobility and worsening symptoms with activity, Dhaval is currently unable to engage in gainful appointment.  She has applied for disability benefits which is pending at this time.  Please feel free to contact my office if any additional documentation or clarification is needed.      Sincerely,        POPPY Gonsalez

## 2025-06-17 NOTE — TELEPHONE ENCOUNTER
Caller: Dhaval Aguilera    Relationship: Self    Best call back number: [phone]     What form or medical record are you requesting: LETTER ABOUT HER HEALTH FOR MEDICAID    Who is requesting this form or medical record from you: MEDICAID    How would you like to receive the form or medical records (pick-up, mail, fax): PUT IN MYCHART  Timeframe paperwork needed: ASAP    Additional notes:

## 2025-06-24 RX ORDER — NICOTINE 21 MG/24HR
1 PATCH, TRANSDERMAL 24 HOURS TRANSDERMAL EVERY 24 HOURS
Qty: 28 PATCH | Refills: 0 | Status: SHIPPED | OUTPATIENT
Start: 2025-06-24

## 2025-06-24 RX ORDER — NICOTINE 21 MG/24HR
1 PATCH, TRANSDERMAL 24 HOURS TRANSDERMAL EVERY 24 HOURS
Qty: 14 PATCH | Refills: 0 | Status: SHIPPED | OUTPATIENT
Start: 2025-06-24

## 2025-06-25 DIAGNOSIS — R23.2 HOT FLASHES: ICD-10-CM

## 2025-06-25 DIAGNOSIS — F41.9 ANXIETY: ICD-10-CM

## 2025-06-25 RX ORDER — VENLAFAXINE HYDROCHLORIDE 37.5 MG/1
37.5 CAPSULE, EXTENDED RELEASE ORAL DAILY
Qty: 90 CAPSULE | Refills: 1 | Status: SHIPPED | OUTPATIENT
Start: 2025-06-25

## 2025-08-17 ENCOUNTER — APPOINTMENT (OUTPATIENT)
Dept: CT IMAGING | Facility: HOSPITAL | Age: 50
End: 2025-08-17
Attending: FAMILY MEDICINE
Payer: MEDICAID

## 2025-08-17 ENCOUNTER — HOSPITAL ENCOUNTER (EMERGENCY)
Facility: HOSPITAL | Age: 50
Discharge: HOME | End: 2025-08-18
Attending: FAMILY MEDICINE
Payer: MEDICAID

## 2025-08-17 DIAGNOSIS — K57.32 DIVERTICULITIS OF COLON: Primary | ICD-10-CM

## 2025-08-17 LAB
ALBUMIN SERPL-MCNC: 4.3 G/DL (ref 3.4–4.8)
ALBUMIN/GLOB SERPL: 1.7 {RATIO} (ref 0.8–2)
ALP SERPL-CCNC: 83 U/L (ref 25–100)
ALT SERPL-CCNC: 55 U/L (ref 4–36)
ANION GAP SERPL CALCULATED.3IONS-SCNC: 14 MMOL/L (ref 3–16)
AST SERPL-CCNC: 34 U/L (ref 8–33)
BASOPHILS # BLD: 0 K/UL (ref 0–0.1)
BASOPHILS NFR BLD: 0.2 %
BILIRUB SERPL-MCNC: 0.6 MG/DL (ref 0.3–1.2)
BILIRUB UR QL STRIP.AUTO: NEGATIVE
BUN SERPL-MCNC: 13 MG/DL (ref 6–20)
CALCIUM SERPL-MCNC: 9.2 MG/DL (ref 8.3–10.6)
CHLORIDE SERPL-SCNC: 102 MMOL/L (ref 98–107)
CLARITY UR: CLEAR
CO2 SERPL-SCNC: 25 MMOL/L (ref 20–30)
COLOR UR: YELLOW
CREAT SERPL-MCNC: 1 MG/DL (ref 0.6–1.1)
EOSINOPHIL # BLD: 0.1 K/UL (ref 0–0.4)
EOSINOPHIL NFR BLD: 1.3 %
ERYTHROCYTE [DISTWIDTH] IN BLOOD BY AUTOMATED COUNT: 11.7 % (ref 11–16)
GFR SERPLBLD CREATININE-BSD FMLA CKD-EPI: 68 ML/MIN/{1.73_M2}
GLOBULIN SER CALC-MCNC: 2.5 G/DL
GLUCOSE SERPL-MCNC: 115 MG/DL (ref 74–106)
GLUCOSE UR STRIP.AUTO-MCNC: NEGATIVE MG/DL
HCT VFR BLD AUTO: 38.9 % (ref 37–47)
HGB BLD-MCNC: 13.6 G/DL (ref 11.5–16.5)
HGB UR QL STRIP.AUTO: NEGATIVE
IMM GRANULOCYTES # BLD: 0 K/UL
IMM GRANULOCYTES NFR BLD: 0.4 % (ref 0–5)
KETONES UR STRIP.AUTO-MCNC: NEGATIVE MG/DL
LEUKOCYTE ESTERASE UR QL STRIP.AUTO: NEGATIVE
LIPASE SERPL-CCNC: 29 U/L (ref 5.6–51.3)
LYMPHOCYTES # BLD: 2.2 K/UL (ref 1.5–4)
LYMPHOCYTES NFR BLD: 20.4 %
MCH RBC QN AUTO: 30.5 PG (ref 27–32)
MCHC RBC AUTO-ENTMCNC: 35 G/DL (ref 31–35)
MCV RBC AUTO: 87.2 FL (ref 80–100)
MONOCYTES # BLD: 1 K/UL (ref 0.2–0.8)
MONOCYTES NFR BLD: 8.7 %
NEUTROPHILS # BLD: 7.5 K/UL (ref 2–7.5)
NEUTS SEG NFR BLD: 69 %
NITRITE UR QL STRIP.AUTO: NEGATIVE
PH UR STRIP.AUTO: 6.5 [PH] (ref 5–8)
PLATELET # BLD AUTO: 213 K/UL (ref 150–400)
PMV BLD AUTO: 9.4 FL (ref 6–10)
POTASSIUM SERPL-SCNC: 3.9 MMOL/L (ref 3.4–5.1)
PROT SERPL-MCNC: 6.8 G/DL (ref 6.4–8.3)
PROT UR STRIP.AUTO-MCNC: NEGATIVE MG/DL
RBC # BLD AUTO: 4.46 M/UL (ref 3.8–5.8)
SODIUM SERPL-SCNC: 141 MMOL/L (ref 136–145)
SP GR UR STRIP.AUTO: 1.02 (ref 1–1.03)
UA COMPLETE W REFLEX CULTURE PNL UR: ABNORMAL
UA DIPSTICK W REFLEX MICRO PNL UR: ABNORMAL
URN SPEC COLLECT METH UR: ABNORMAL
UROBILINOGEN UR STRIP-ACNC: 2 E.U./DL
WBC # BLD AUTO: 10.9 K/UL (ref 4–11)

## 2025-08-17 PROCEDURE — 6370000000 HC RX 637 (ALT 250 FOR IP): Performed by: FAMILY MEDICINE

## 2025-08-17 PROCEDURE — 99285 EMERGENCY DEPT VISIT HI MDM: CPT

## 2025-08-17 PROCEDURE — 83690 ASSAY OF LIPASE: CPT

## 2025-08-17 PROCEDURE — 85025 COMPLETE CBC W/AUTO DIFF WBC: CPT

## 2025-08-17 PROCEDURE — 81003 URINALYSIS AUTO W/O SCOPE: CPT

## 2025-08-17 PROCEDURE — 80053 COMPREHEN METABOLIC PANEL: CPT

## 2025-08-17 PROCEDURE — 36415 COLL VENOUS BLD VENIPUNCTURE: CPT

## 2025-08-17 PROCEDURE — 74177 CT ABD & PELVIS W/CONTRAST: CPT

## 2025-08-17 PROCEDURE — 6360000004 HC RX CONTRAST MEDICATION: Performed by: FAMILY MEDICINE

## 2025-08-17 RX ORDER — METRONIDAZOLE 500 MG/1
500 TABLET ORAL 2 TIMES DAILY
Qty: 14 TABLET | Refills: 0 | Status: SHIPPED | OUTPATIENT
Start: 2025-08-17 | End: 2025-08-20 | Stop reason: SINTOL

## 2025-08-17 RX ORDER — CIPROFLOXACIN 500 MG/1
500 TABLET, FILM COATED ORAL ONCE
Status: COMPLETED | OUTPATIENT
Start: 2025-08-18 | End: 2025-08-17

## 2025-08-17 RX ORDER — METRONIDAZOLE 500 MG/1
500 TABLET ORAL ONCE
Status: COMPLETED | OUTPATIENT
Start: 2025-08-18 | End: 2025-08-17

## 2025-08-17 RX ORDER — IOPAMIDOL 755 MG/ML
100 INJECTION, SOLUTION INTRAVASCULAR
Status: COMPLETED | OUTPATIENT
Start: 2025-08-17 | End: 2025-08-17

## 2025-08-17 RX ORDER — CIPROFLOXACIN 500 MG/1
500 TABLET, FILM COATED ORAL 2 TIMES DAILY
Qty: 14 TABLET | Refills: 0 | Status: SHIPPED | OUTPATIENT
Start: 2025-08-17 | End: 2025-08-24

## 2025-08-17 RX ADMIN — CIPROFLOXACIN HYDROCHLORIDE 500 MG: 500 TABLET, FILM COATED ORAL at 23:59

## 2025-08-17 RX ADMIN — METRONIDAZOLE 500 MG: 500 TABLET ORAL at 23:59

## 2025-08-17 RX ADMIN — IOPAMIDOL 100 ML: 755 INJECTION, SOLUTION INTRAVENOUS at 23:23

## 2025-08-17 ASSESSMENT — PAIN DESCRIPTION - LOCATION: LOCATION: ABDOMEN

## 2025-08-17 ASSESSMENT — PAIN SCALES - GENERAL: PAINLEVEL_OUTOF10: 8

## 2025-08-17 ASSESSMENT — PAIN DESCRIPTION - ORIENTATION: ORIENTATION: LEFT;LOWER

## 2025-08-18 VITALS
HEIGHT: 67 IN | DIASTOLIC BLOOD PRESSURE: 78 MMHG | RESPIRATION RATE: 18 BRPM | HEART RATE: 115 BPM | SYSTOLIC BLOOD PRESSURE: 168 MMHG | TEMPERATURE: 98.3 F | WEIGHT: 232.6 LBS | OXYGEN SATURATION: 97 % | BODY MASS INDEX: 36.51 KG/M2

## 2025-08-20 ENCOUNTER — HOSPITAL ENCOUNTER (EMERGENCY)
Facility: HOSPITAL | Age: 50
Discharge: HOME | End: 2025-08-20
Attending: EMERGENCY MEDICINE
Payer: MEDICAID

## 2025-08-20 VITALS
DIASTOLIC BLOOD PRESSURE: 82 MMHG | OXYGEN SATURATION: 99 % | TEMPERATURE: 98.4 F | BODY MASS INDEX: 36.41 KG/M2 | HEART RATE: 89 BPM | HEIGHT: 67 IN | SYSTOLIC BLOOD PRESSURE: 149 MMHG | WEIGHT: 232 LBS | RESPIRATION RATE: 19 BRPM

## 2025-08-20 DIAGNOSIS — R19.7 DIARRHEA, UNSPECIFIED TYPE: Primary | ICD-10-CM

## 2025-08-20 LAB
ALBUMIN SERPL-MCNC: 4.1 G/DL (ref 3.4–4.8)
ALBUMIN/GLOB SERPL: 1.5 {RATIO} (ref 0.8–2)
ALP SERPL-CCNC: 76 U/L (ref 25–100)
ALT SERPL-CCNC: 42 U/L (ref 4–36)
ANION GAP SERPL CALCULATED.3IONS-SCNC: 15 MMOL/L (ref 3–16)
AST SERPL-CCNC: 33 U/L (ref 8–33)
BACTERIA URNS QL MICRO: ABNORMAL /HPF
BASOPHILS # BLD: 0 K/UL (ref 0–0.1)
BASOPHILS NFR BLD: 0.1 %
BILIRUB SERPL-MCNC: 0.5 MG/DL (ref 0.3–1.2)
BILIRUB UR QL STRIP.AUTO: NEGATIVE
BUN SERPL-MCNC: 24 MG/DL (ref 6–20)
CALCIUM SERPL-MCNC: 9.5 MG/DL (ref 8.3–10.6)
CHLORIDE SERPL-SCNC: 104 MMOL/L (ref 98–107)
CLARITY UR: ABNORMAL
CO2 SERPL-SCNC: 22 MMOL/L (ref 20–30)
COLOR UR: YELLOW
CREAT SERPL-MCNC: 2.8 MG/DL (ref 0.6–1.1)
EOSINOPHIL # BLD: 0.2 K/UL (ref 0–0.4)
EOSINOPHIL NFR BLD: 2.5 %
ERYTHROCYTE [DISTWIDTH] IN BLOOD BY AUTOMATED COUNT: 11.5 % (ref 11–16)
FLUAV AG NPH QL: NEGATIVE
FLUBV AG NPH QL: NEGATIVE
GFR SERPLBLD CREATININE-BSD FMLA CKD-EPI: 20 ML/MIN/{1.73_M2}
GLOBULIN SER CALC-MCNC: 2.8 G/DL
GLUCOSE SERPL-MCNC: 109 MG/DL (ref 74–106)
GLUCOSE UR STRIP.AUTO-MCNC: NEGATIVE MG/DL
HCT VFR BLD AUTO: 38.7 % (ref 37–47)
HGB BLD-MCNC: 13.7 G/DL (ref 11.5–16.5)
HGB UR QL STRIP.AUTO: ABNORMAL
IMM GRANULOCYTES # BLD: 0 K/UL
IMM GRANULOCYTES NFR BLD: 0.3 % (ref 0–5)
KETONES UR STRIP.AUTO-MCNC: NEGATIVE MG/DL
LACTATE BLDV-SCNC: 1.8 MMOL/L (ref 0.4–2)
LEUKOCYTE ESTERASE UR QL STRIP.AUTO: NEGATIVE
LIPASE SERPL-CCNC: 27 U/L (ref 5.6–51.3)
LYMPHOCYTES # BLD: 1.7 K/UL (ref 1.5–4)
LYMPHOCYTES NFR BLD: 23.1 %
MAGNESIUM SERPL-MCNC: 2.2 MG/DL (ref 1.7–2.4)
MCH RBC QN AUTO: 30.8 PG (ref 27–32)
MCHC RBC AUTO-ENTMCNC: 35.4 G/DL (ref 31–35)
MCV RBC AUTO: 87 FL (ref 80–100)
MONOCYTES # BLD: 0.6 K/UL (ref 0.2–0.8)
MONOCYTES NFR BLD: 7.6 %
NEUTROPHILS # BLD: 4.8 K/UL (ref 2–7.5)
NEUTS SEG NFR BLD: 66.4 %
NITRITE UR QL STRIP.AUTO: NEGATIVE
PH UR STRIP.AUTO: 5.5 [PH] (ref 5–8)
PLATELET # BLD AUTO: 221 K/UL (ref 150–400)
PMV BLD AUTO: 9.5 FL (ref 6–10)
POTASSIUM SERPL-SCNC: 4 MMOL/L (ref 3.4–5.1)
PROT SERPL-MCNC: 6.9 G/DL (ref 6.4–8.3)
PROT UR STRIP.AUTO-MCNC: NEGATIVE MG/DL
RBC # BLD AUTO: 4.45 M/UL (ref 3.8–5.8)
RBC #/AREA URNS HPF: ABNORMAL /HPF (ref 0–4)
RENAL EPI CELLS #/AREA URNS HPF: ABNORMAL /HPF (ref 0–1)
SARS-COV-2 RDRP RESP QL NAA+PROBE: NOT DETECTED
SODIUM SERPL-SCNC: 141 MMOL/L (ref 136–145)
SP GR UR STRIP.AUTO: 1.01 (ref 1–1.03)
UA COMPLETE W REFLEX CULTURE PNL UR: ABNORMAL
UA DIPSTICK W REFLEX MICRO PNL UR: YES
URN SPEC COLLECT METH UR: ABNORMAL
UROBILINOGEN UR STRIP-ACNC: 0.2 E.U./DL
WBC # BLD AUTO: 7.2 K/UL (ref 4–11)
WBC #/AREA URNS HPF: ABNORMAL /HPF (ref 0–5)

## 2025-08-20 PROCEDURE — 83690 ASSAY OF LIPASE: CPT

## 2025-08-20 PROCEDURE — 99284 EMERGENCY DEPT VISIT MOD MDM: CPT

## 2025-08-20 PROCEDURE — 83735 ASSAY OF MAGNESIUM: CPT

## 2025-08-20 PROCEDURE — 80053 COMPREHEN METABOLIC PANEL: CPT

## 2025-08-20 PROCEDURE — 83605 ASSAY OF LACTIC ACID: CPT

## 2025-08-20 PROCEDURE — 87804 INFLUENZA ASSAY W/OPTIC: CPT

## 2025-08-20 PROCEDURE — 87635 SARS-COV-2 COVID-19 AMP PRB: CPT

## 2025-08-20 PROCEDURE — 2580000003 HC RX 258: Performed by: EMERGENCY MEDICINE

## 2025-08-20 PROCEDURE — 85025 COMPLETE CBC W/AUTO DIFF WBC: CPT

## 2025-08-20 PROCEDURE — 36415 COLL VENOUS BLD VENIPUNCTURE: CPT

## 2025-08-20 PROCEDURE — 81001 URINALYSIS AUTO W/SCOPE: CPT

## 2025-08-20 RX ORDER — 0.9 % SODIUM CHLORIDE 0.9 %
1000 INTRAVENOUS SOLUTION INTRAVENOUS ONCE
Status: COMPLETED | OUTPATIENT
Start: 2025-08-20 | End: 2025-08-20

## 2025-08-20 RX ADMIN — SODIUM CHLORIDE 1000 ML: 0.9 INJECTION, SOLUTION INTRAVENOUS at 20:34

## 2025-08-20 ASSESSMENT — PAIN - FUNCTIONAL ASSESSMENT: PAIN_FUNCTIONAL_ASSESSMENT: 0-10

## 2025-08-26 ENCOUNTER — TELEPHONE (OUTPATIENT)
Dept: INTERNAL MEDICINE | Facility: CLINIC | Age: 50
End: 2025-08-26
Payer: MEDICAID

## 2025-08-26 ENCOUNTER — OFFICE VISIT (OUTPATIENT)
Dept: INTERNAL MEDICINE | Facility: CLINIC | Age: 50
End: 2025-08-26
Payer: MEDICAID

## 2025-08-26 VITALS
TEMPERATURE: 97.4 F | BODY MASS INDEX: 36.26 KG/M2 | OXYGEN SATURATION: 97 % | HEIGHT: 67 IN | DIASTOLIC BLOOD PRESSURE: 68 MMHG | WEIGHT: 231 LBS | HEART RATE: 96 BPM | RESPIRATION RATE: 16 BRPM | SYSTOLIC BLOOD PRESSURE: 132 MMHG

## 2025-08-26 DIAGNOSIS — K76.0 HEPATIC STEATOSIS: ICD-10-CM

## 2025-08-26 DIAGNOSIS — N17.9 ACUTE KIDNEY INJURY: Primary | ICD-10-CM

## 2025-08-26 DIAGNOSIS — K57.92 DIVERTICULITIS: ICD-10-CM

## 2025-08-26 DIAGNOSIS — R19.5 POSITIVE COLORECTAL CANCER SCREENING USING COLOGUARD TEST: ICD-10-CM

## 2025-08-27 LAB
ALBUMIN SERPL-MCNC: 4.3 G/DL (ref 3.5–5.2)
ALBUMIN/GLOB SERPL: 2 G/DL
ALP SERPL-CCNC: 82 U/L (ref 39–117)
ALT SERPL-CCNC: 30 U/L (ref 1–33)
AST SERPL-CCNC: 23 U/L (ref 1–32)
BILIRUB SERPL-MCNC: 0.4 MG/DL (ref 0–1.2)
BUN SERPL-MCNC: 19 MG/DL (ref 6–20)
BUN/CREAT SERPL: 16.2 (ref 7–25)
CALCIUM SERPL-MCNC: 9.4 MG/DL (ref 8.6–10.5)
CHLORIDE SERPL-SCNC: 104 MMOL/L (ref 98–107)
CO2 SERPL-SCNC: 26.7 MMOL/L (ref 22–29)
CREAT SERPL-MCNC: 1.17 MG/DL (ref 0.57–1)
EGFRCR SERPLBLD CKD-EPI 2021: 56.3 ML/MIN/1.73
GLOBULIN SER CALC-MCNC: 2.2 GM/DL
GLUCOSE SERPL-MCNC: 98 MG/DL (ref 65–99)
POTASSIUM SERPL-SCNC: 4.2 MMOL/L (ref 3.5–5.2)
PROT SERPL-MCNC: 6.5 G/DL (ref 6–8.5)
SODIUM SERPL-SCNC: 144 MMOL/L (ref 136–145)